# Patient Record
Sex: MALE | Race: WHITE | Employment: OTHER | URBAN - NONMETROPOLITAN AREA
[De-identification: names, ages, dates, MRNs, and addresses within clinical notes are randomized per-mention and may not be internally consistent; named-entity substitution may affect disease eponyms.]

---

## 2017-01-09 DIAGNOSIS — E08.29 DIABETES MELLITUS DUE TO UNDERLYING CONDITION WITH OTHER DIABETIC KIDNEY COMPLICATION, WITH LONG-TERM CURRENT USE OF INSULIN (HCC): Primary | ICD-10-CM

## 2017-01-09 DIAGNOSIS — Z79.4 DIABETES MELLITUS DUE TO UNDERLYING CONDITION WITH OTHER DIABETIC KIDNEY COMPLICATION, WITH LONG-TERM CURRENT USE OF INSULIN (HCC): Primary | ICD-10-CM

## 2017-01-09 DIAGNOSIS — E11.42 TYPE 2 DIABETES MELLITUS WITH DIABETIC POLYNEUROPATHY, UNSPECIFIED LONG TERM INSULIN USE STATUS: ICD-10-CM

## 2017-01-09 RX ORDER — GLIMEPIRIDE 4 MG/1
4 TABLET ORAL
Qty: 60 TABLET | Refills: 3 | OUTPATIENT
Start: 2017-01-09 | End: 2017-03-30 | Stop reason: SDUPTHER

## 2017-01-09 RX ORDER — GLIMEPIRIDE 1 MG/1
1 TABLET ORAL EVERY EVENING
Qty: 60 TABLET | Refills: 3 | Status: SHIPPED | OUTPATIENT
Start: 2017-01-09 | End: 2017-02-06 | Stop reason: SDUPTHER

## 2017-02-06 ENCOUNTER — OFFICE VISIT (OUTPATIENT)
Dept: INTERNAL MEDICINE CLINIC | Age: 67
End: 2017-02-06

## 2017-02-06 VITALS
BODY MASS INDEX: 38.81 KG/M2 | SYSTOLIC BLOOD PRESSURE: 120 MMHG | WEIGHT: 277.2 LBS | HEIGHT: 71 IN | HEART RATE: 61 BPM | OXYGEN SATURATION: 98 % | RESPIRATION RATE: 12 BRPM | DIASTOLIC BLOOD PRESSURE: 70 MMHG

## 2017-02-06 DIAGNOSIS — Z79.4 DIABETES MELLITUS DUE TO UNDERLYING CONDITION WITH OTHER DIABETIC KIDNEY COMPLICATION, WITH LONG-TERM CURRENT USE OF INSULIN (HCC): Primary | ICD-10-CM

## 2017-02-06 DIAGNOSIS — E08.29 DIABETES MELLITUS DUE TO UNDERLYING CONDITION WITH OTHER DIABETIC KIDNEY COMPLICATION, WITH LONG-TERM CURRENT USE OF INSULIN (HCC): Primary | ICD-10-CM

## 2017-02-06 DIAGNOSIS — I10 ESSENTIAL HYPERTENSION: ICD-10-CM

## 2017-02-06 DIAGNOSIS — E78.2 MIXED HYPERLIPIDEMIA: ICD-10-CM

## 2017-02-06 DIAGNOSIS — N18.3 CKD (CHRONIC KIDNEY DISEASE), STAGE 3 (MODERATE): ICD-10-CM

## 2017-02-06 LAB
BILIRUBIN URINE: NEGATIVE
BLOOD, URINE: NEGATIVE
CLARITY: CLEAR
COLOR: YELLOW
EPITHELIAL CELLS, UA: 1 /HPF (ref 0–5)
GLUCOSE URINE: 100 MG/DL
HYALINE CASTS: 0 /HPF (ref 0–8)
KETONES, URINE: NEGATIVE MG/DL
LEUKOCYTE ESTERASE, URINE: NEGATIVE
MICROSCOPIC EXAMINATION: ABNORMAL
NITRITE, URINE: NEGATIVE
PH UA: 6.5
PROTEIN UA: NEGATIVE MG/DL
RBC UA: 0 /HPF (ref 0–4)
SPECIFIC GRAVITY UA: 1.02
UROBILINOGEN, URINE: 0.2 E.U./DL
WBC UA: 0 /HPF (ref 0–5)

## 2017-02-06 PROCEDURE — 99214 OFFICE O/P EST MOD 30 MIN: CPT | Performed by: INTERNAL MEDICINE

## 2017-02-06 PROCEDURE — 1123F ACP DISCUSS/DSCN MKR DOCD: CPT | Performed by: INTERNAL MEDICINE

## 2017-02-06 PROCEDURE — 81001 URINALYSIS AUTO W/SCOPE: CPT | Performed by: INTERNAL MEDICINE

## 2017-02-06 PROCEDURE — G8484 FLU IMMUNIZE NO ADMIN: HCPCS | Performed by: INTERNAL MEDICINE

## 2017-02-06 PROCEDURE — 3017F COLORECTAL CA SCREEN DOC REV: CPT | Performed by: INTERNAL MEDICINE

## 2017-02-06 PROCEDURE — 4040F PNEUMOC VAC/ADMIN/RCVD: CPT | Performed by: INTERNAL MEDICINE

## 2017-02-06 PROCEDURE — G8419 CALC BMI OUT NRM PARAM NOF/U: HCPCS | Performed by: INTERNAL MEDICINE

## 2017-02-06 PROCEDURE — G8427 DOCREV CUR MEDS BY ELIG CLIN: HCPCS | Performed by: INTERNAL MEDICINE

## 2017-02-06 PROCEDURE — 1036F TOBACCO NON-USER: CPT | Performed by: INTERNAL MEDICINE

## 2017-02-06 RX ORDER — OMEGA-3-ACID ETHYL ESTERS 1 G/1
2 CAPSULE, LIQUID FILLED ORAL 2 TIMES DAILY
Qty: 60 CAPSULE | Refills: 3 | Status: SHIPPED | OUTPATIENT
Start: 2017-02-06 | End: 2017-02-06 | Stop reason: SDUPTHER

## 2017-02-06 RX ORDER — GLIMEPIRIDE 1 MG/1
2 TABLET ORAL EVERY EVENING
Qty: 60 TABLET | Refills: 3 | Status: SHIPPED | OUTPATIENT
Start: 2017-02-06 | End: 2017-03-30 | Stop reason: SDUPTHER

## 2017-02-06 RX ORDER — OMEGA-3-ACID ETHYL ESTERS 1 G/1
2 CAPSULE, LIQUID FILLED ORAL 2 TIMES DAILY
Qty: 120 CAPSULE | Refills: 3 | Status: SHIPPED | OUTPATIENT
Start: 2017-02-06 | End: 2019-07-11 | Stop reason: ALTCHOICE

## 2017-03-28 ENCOUNTER — TELEPHONE (OUTPATIENT)
Dept: INTERNAL MEDICINE CLINIC | Age: 67
End: 2017-03-28

## 2017-03-28 DIAGNOSIS — Z79.4 DIABETES MELLITUS DUE TO UNDERLYING CONDITION WITH OTHER DIABETIC KIDNEY COMPLICATION, WITH LONG-TERM CURRENT USE OF INSULIN (HCC): ICD-10-CM

## 2017-03-28 DIAGNOSIS — N39.490 OVERFLOW INCONTINENCE: ICD-10-CM

## 2017-03-28 DIAGNOSIS — E08.29 DIABETES MELLITUS DUE TO UNDERLYING CONDITION WITH OTHER DIABETIC KIDNEY COMPLICATION, WITH LONG-TERM CURRENT USE OF INSULIN (HCC): ICD-10-CM

## 2017-03-28 DIAGNOSIS — E11.42 TYPE 2 DIABETES MELLITUS WITH DIABETIC POLYNEUROPATHY, UNSPECIFIED LONG TERM INSULIN USE STATUS: ICD-10-CM

## 2017-03-29 DIAGNOSIS — Z79.4 DIABETES MELLITUS DUE TO UNDERLYING CONDITION WITH OTHER DIABETIC KIDNEY COMPLICATION, WITH LONG-TERM CURRENT USE OF INSULIN (HCC): ICD-10-CM

## 2017-03-29 DIAGNOSIS — N39.490 OVERFLOW INCONTINENCE: ICD-10-CM

## 2017-03-29 DIAGNOSIS — E11.22 TYPE 2 DIABETES MELLITUS WITH STAGE 3 CHRONIC KIDNEY DISEASE, WITHOUT LONG-TERM CURRENT USE OF INSULIN (HCC): ICD-10-CM

## 2017-03-29 DIAGNOSIS — E08.29 DIABETES MELLITUS DUE TO UNDERLYING CONDITION WITH OTHER DIABETIC KIDNEY COMPLICATION, WITH LONG-TERM CURRENT USE OF INSULIN (HCC): ICD-10-CM

## 2017-03-29 DIAGNOSIS — E11.42 TYPE 2 DIABETES MELLITUS WITH DIABETIC POLYNEUROPATHY, UNSPECIFIED LONG TERM INSULIN USE STATUS: ICD-10-CM

## 2017-03-29 DIAGNOSIS — N18.30 TYPE 2 DIABETES MELLITUS WITH STAGE 3 CHRONIC KIDNEY DISEASE, WITHOUT LONG-TERM CURRENT USE OF INSULIN (HCC): ICD-10-CM

## 2017-03-29 RX ORDER — OXYBUTYNIN CHLORIDE 5 MG/1
5 TABLET ORAL 3 TIMES DAILY
Qty: 270 TABLET | Refills: 3 | Status: CANCELLED | OUTPATIENT
Start: 2017-03-29

## 2017-03-29 RX ORDER — GLIMEPIRIDE 4 MG/1
4 TABLET ORAL
Qty: 60 TABLET | Refills: 3 | Status: CANCELLED | OUTPATIENT
Start: 2017-03-29

## 2017-03-29 RX ORDER — GLIMEPIRIDE 1 MG/1
2 TABLET ORAL EVERY EVENING
Qty: 60 TABLET | Refills: 3 | Status: CANCELLED | OUTPATIENT
Start: 2017-03-29

## 2017-03-29 RX ORDER — BENAZEPRIL HYDROCHLORIDE 40 MG/1
40 TABLET, FILM COATED ORAL DAILY
Qty: 30 TABLET | Refills: 3 | Status: CANCELLED | OUTPATIENT
Start: 2017-03-29

## 2017-03-30 RX ORDER — BENAZEPRIL HYDROCHLORIDE 40 MG/1
40 TABLET, FILM COATED ORAL DAILY
Qty: 90 TABLET | Refills: 1 | Status: SHIPPED | OUTPATIENT
Start: 2017-03-30

## 2017-03-30 RX ORDER — OXYBUTYNIN CHLORIDE 5 MG/1
5 TABLET ORAL 3 TIMES DAILY
Qty: 270 TABLET | Refills: 1 | Status: SHIPPED | OUTPATIENT
Start: 2017-03-30

## 2017-03-30 RX ORDER — GLIMEPIRIDE 4 MG/1
4 TABLET ORAL
Qty: 90 TABLET | Refills: 1 | Status: SHIPPED | OUTPATIENT
Start: 2017-03-30 | End: 2019-07-11 | Stop reason: ALTCHOICE

## 2017-03-30 RX ORDER — GLIMEPIRIDE 1 MG/1
2 TABLET ORAL EVERY EVENING
Qty: 180 TABLET | Refills: 1 | Status: SHIPPED | OUTPATIENT
Start: 2017-03-30 | End: 2019-07-11 | Stop reason: ALTCHOICE

## 2019-07-11 ENCOUNTER — APPOINTMENT (OUTPATIENT)
Dept: GENERAL RADIOLOGY | Age: 69
End: 2019-07-11
Payer: MEDICARE

## 2019-07-11 ENCOUNTER — HOSPITAL ENCOUNTER (EMERGENCY)
Age: 69
Discharge: HOME | End: 2019-07-11
Attending: EMERGENCY MEDICINE
Payer: MEDICARE

## 2019-07-11 ENCOUNTER — APPOINTMENT (OUTPATIENT)
Dept: CT IMAGING | Age: 69
End: 2019-07-11
Payer: MEDICARE

## 2019-07-11 VITALS
RESPIRATION RATE: 16 BRPM | DIASTOLIC BLOOD PRESSURE: 67 MMHG | OXYGEN SATURATION: 96 % | HEIGHT: 71 IN | TEMPERATURE: 98.5 F | WEIGHT: 286 LBS | HEART RATE: 77 BPM | SYSTOLIC BLOOD PRESSURE: 150 MMHG | BODY MASS INDEX: 40.04 KG/M2

## 2019-07-11 DIAGNOSIS — R82.71 BACTERIA IN URINE: Primary | ICD-10-CM

## 2019-07-11 DIAGNOSIS — R10.9 FLANK PAIN: ICD-10-CM

## 2019-07-11 DIAGNOSIS — R11.0 NAUSEA: ICD-10-CM

## 2019-07-11 LAB
ALBUMIN SERPL-MCNC: 4.9 GM/DL (ref 3.4–5)
ALP BLD-CCNC: 68 IU/L (ref 40–129)
ALT SERPL-CCNC: 27 U/L (ref 10–40)
ANION GAP SERPL CALCULATED.3IONS-SCNC: 15 MMOL/L (ref 4–16)
AST SERPL-CCNC: 23 IU/L (ref 15–37)
BACTERIA: ABNORMAL /HPF
BASOPHILS ABSOLUTE: 0.1 K/CU MM
BASOPHILS RELATIVE PERCENT: 1 % (ref 0–1)
BILIRUB SERPL-MCNC: 0.5 MG/DL (ref 0–1)
BILIRUBIN URINE: NEGATIVE MG/DL
BLOOD, URINE: NEGATIVE
BUN BLDV-MCNC: 29 MG/DL (ref 6–23)
CALCIUM SERPL-MCNC: 10.6 MG/DL (ref 8.3–10.6)
CAST TYPE: ABNORMAL /HPF
CHLORIDE BLD-SCNC: 94 MMOL/L (ref 99–110)
CLARITY: CLEAR
CO2: 27 MMOL/L (ref 21–32)
COLOR: YELLOW
CREAT SERPL-MCNC: 1.1 MG/DL (ref 0.9–1.3)
CRYSTAL TYPE: ABNORMAL /HPF
DIFFERENTIAL TYPE: ABNORMAL
EOSINOPHILS ABSOLUTE: 0.2 K/CU MM
EOSINOPHILS RELATIVE PERCENT: 2.7 % (ref 0–3)
EPITHELIAL CELLS, UA: ABNORMAL /HPF
GFR AFRICAN AMERICAN: >60 ML/MIN/1.73M2
GFR NON-AFRICAN AMERICAN: >60 ML/MIN/1.73M2
GLUCOSE BLD-MCNC: 238 MG/DL (ref 70–99)
GLUCOSE, URINE: 250 MG/DL
HCT VFR BLD CALC: 49.9 % (ref 42–52)
HEMOGLOBIN: 17 GM/DL (ref 13.5–18)
IMMATURE NEUTROPHIL %: 0.4 % (ref 0–0.43)
KETONES, URINE: NEGATIVE MG/DL
LACTATE: ABNORMAL MMOL/L (ref 0.4–2)
LEUKOCYTE ESTERASE, URINE: NEGATIVE
LIPASE: 12 IU/L (ref 13–60)
LYMPHOCYTES ABSOLUTE: 2.6 K/CU MM
LYMPHOCYTES RELATIVE PERCENT: 31.2 % (ref 24–44)
MCH RBC QN AUTO: 31.1 PG (ref 27–31)
MCHC RBC AUTO-ENTMCNC: 34.1 % (ref 32–36)
MCV RBC AUTO: 91.2 FL (ref 78–100)
MONOCYTES ABSOLUTE: 0.9 K/CU MM
MONOCYTES RELATIVE PERCENT: 11.2 % (ref 0–4)
MUCUS: NEGATIVE HPF
NITRITE URINE, QUANTITATIVE: NEGATIVE
PDW BLD-RTO: 13.1 % (ref 11.7–14.9)
PH, URINE: 5.5 (ref 5–8)
PLATELET # BLD: 233 K/CU MM (ref 140–440)
PMV BLD AUTO: 11.1 FL (ref 7.5–11.1)
POTASSIUM SERPL-SCNC: 4.2 MMOL/L (ref 3.5–5.1)
PROTEIN UA: ABNORMAL MG/DL
RBC # BLD: 5.47 M/CU MM (ref 4.6–6.2)
RBC URINE: ABNORMAL /HPF (ref 0–3)
SEGMENTED NEUTROPHILS ABSOLUTE COUNT: 4.5 K/CU MM
SEGMENTED NEUTROPHILS RELATIVE PERCENT: 53.5 % (ref 36–66)
SODIUM BLD-SCNC: 136 MMOL/L (ref 135–145)
SPECIFIC GRAVITY UA: 1.01 (ref 1–1.03)
TOTAL IMMATURE NEUTOROPHIL: 0.03 K/CU MM
TOTAL PROTEIN: 8.3 GM/DL (ref 6.4–8.2)
UROBILINOGEN, URINE: 1 MG/DL (ref 0.2–1)
VOLUME, (UVOL): 12 ML (ref 10–12)
WBC # BLD: 8.3 K/CU MM (ref 4–10.5)
WBC UA: ABNORMAL /HPF (ref 0–2)

## 2019-07-11 PROCEDURE — 87086 URINE CULTURE/COLONY COUNT: CPT

## 2019-07-11 PROCEDURE — 81001 URINALYSIS AUTO W/SCOPE: CPT

## 2019-07-11 PROCEDURE — 96375 TX/PRO/DX INJ NEW DRUG ADDON: CPT

## 2019-07-11 PROCEDURE — 6360000004 HC RX CONTRAST MEDICATION: Performed by: EMERGENCY MEDICINE

## 2019-07-11 PROCEDURE — 99284 EMERGENCY DEPT VISIT MOD MDM: CPT

## 2019-07-11 PROCEDURE — 74177 CT ABD & PELVIS W/CONTRAST: CPT

## 2019-07-11 PROCEDURE — 2580000003 HC RX 258: Performed by: EMERGENCY MEDICINE

## 2019-07-11 PROCEDURE — 96374 THER/PROPH/DIAG INJ IV PUSH: CPT

## 2019-07-11 PROCEDURE — 85025 COMPLETE CBC W/AUTO DIFF WBC: CPT

## 2019-07-11 PROCEDURE — 83605 ASSAY OF LACTIC ACID: CPT

## 2019-07-11 PROCEDURE — 80053 COMPREHEN METABOLIC PANEL: CPT

## 2019-07-11 PROCEDURE — 87040 BLOOD CULTURE FOR BACTERIA: CPT

## 2019-07-11 PROCEDURE — 6370000000 HC RX 637 (ALT 250 FOR IP): Performed by: EMERGENCY MEDICINE

## 2019-07-11 PROCEDURE — 71045 X-RAY EXAM CHEST 1 VIEW: CPT

## 2019-07-11 PROCEDURE — 6360000002 HC RX W HCPCS: Performed by: EMERGENCY MEDICINE

## 2019-07-11 PROCEDURE — 83690 ASSAY OF LIPASE: CPT

## 2019-07-11 RX ORDER — ONDANSETRON 4 MG/1
4 TABLET, ORALLY DISINTEGRATING ORAL 3 TIMES DAILY PRN
Qty: 21 TABLET | Refills: 0 | Status: SHIPPED | OUTPATIENT
Start: 2019-07-11 | End: 2020-09-28 | Stop reason: ALTCHOICE

## 2019-07-11 RX ORDER — IBUPROFEN 800 MG/1
800 TABLET ORAL 2 TIMES DAILY PRN
Qty: 30 TABLET | Refills: 0 | Status: ON HOLD | OUTPATIENT
Start: 2019-07-11 | End: 2020-09-30 | Stop reason: HOSPADM

## 2019-07-11 RX ORDER — SULFAMETHOXAZOLE AND TRIMETHOPRIM 800; 160 MG/1; MG/1
1 TABLET ORAL 2 TIMES DAILY
Qty: 28 TABLET | Refills: 0 | Status: SHIPPED | OUTPATIENT
Start: 2019-07-11 | End: 2019-07-25

## 2019-07-11 RX ORDER — ALOGLIPTIN 25 MG/1
12.5 TABLET, FILM COATED ORAL DAILY
COMMUNITY

## 2019-07-11 RX ORDER — INSULIN GLARGINE 100 [IU]/ML
33 INJECTION, SOLUTION SUBCUTANEOUS 2 TIMES DAILY
COMMUNITY

## 2019-07-11 RX ORDER — ONDANSETRON 2 MG/ML
4 INJECTION INTRAMUSCULAR; INTRAVENOUS EVERY 30 MIN PRN
Status: DISCONTINUED | OUTPATIENT
Start: 2019-07-11 | End: 2019-07-11 | Stop reason: HOSPADM

## 2019-07-11 RX ORDER — 0.9 % SODIUM CHLORIDE 0.9 %
1000 INTRAVENOUS SOLUTION INTRAVENOUS ONCE
Status: COMPLETED | OUTPATIENT
Start: 2019-07-11 | End: 2019-07-11

## 2019-07-11 RX ORDER — HYDROCODONE BITARTRATE AND ACETAMINOPHEN 5; 325 MG/1; MG/1
1 TABLET ORAL EVERY 4 HOURS PRN
Qty: 15 TABLET | Refills: 0 | Status: SHIPPED | OUTPATIENT
Start: 2019-07-11 | End: 2019-07-14

## 2019-07-11 RX ORDER — MORPHINE SULFATE 4 MG/ML
4 INJECTION, SOLUTION INTRAMUSCULAR; INTRAVENOUS EVERY 30 MIN PRN
Status: DISCONTINUED | OUTPATIENT
Start: 2019-07-11 | End: 2019-07-11 | Stop reason: HOSPADM

## 2019-07-11 RX ORDER — SULFAMETHOXAZOLE AND TRIMETHOPRIM 800; 160 MG/1; MG/1
1 TABLET ORAL ONCE
Status: COMPLETED | OUTPATIENT
Start: 2019-07-11 | End: 2019-07-11

## 2019-07-11 RX ORDER — DULOXETIN HYDROCHLORIDE 30 MG/1
60 CAPSULE, DELAYED RELEASE ORAL DAILY
COMMUNITY
End: 2021-06-24

## 2019-07-11 RX ADMIN — IOPAMIDOL 75 ML: 755 INJECTION, SOLUTION INTRAVENOUS at 16:02

## 2019-07-11 RX ADMIN — SULFAMETHOXAZOLE AND TRIMETHOPRIM 1 TABLET: 800; 160 TABLET ORAL at 17:00

## 2019-07-11 RX ADMIN — SODIUM CHLORIDE 1000 ML: 9 INJECTION, SOLUTION INTRAVENOUS at 15:07

## 2019-07-11 RX ADMIN — ONDANSETRON 4 MG: 2 INJECTION INTRAMUSCULAR; INTRAVENOUS at 15:11

## 2019-07-11 RX ADMIN — MORPHINE SULFATE 4 MG: 4 INJECTION INTRAVENOUS at 15:12

## 2019-07-11 ASSESSMENT — PAIN SCALES - GENERAL
PAINLEVEL_OUTOF10: 4
PAINLEVEL_OUTOF10: 5
PAINLEVEL_OUTOF10: 5

## 2019-07-11 ASSESSMENT — PAIN DESCRIPTION - DESCRIPTORS: DESCRIPTORS: ACHING

## 2019-07-11 ASSESSMENT — PAIN DESCRIPTION - LOCATION
LOCATION: ABDOMEN;FLANK
LOCATION: BACK

## 2019-07-11 ASSESSMENT — PAIN DESCRIPTION - FREQUENCY: FREQUENCY: CONTINUOUS

## 2019-07-11 ASSESSMENT — PAIN DESCRIPTION - PAIN TYPE
TYPE: ACUTE PAIN
TYPE: ACUTE PAIN

## 2019-07-11 ASSESSMENT — PAIN DESCRIPTION - ORIENTATION: ORIENTATION: LOWER

## 2019-07-11 NOTE — ED NOTES
Pt medicated as ordered. IVF hung and are infusing with no difficulty. Pt has no tenderness to abdomen on palpation. Pt informed of wait for kidney function results to come back before CT scan. Pt verbalizes understanding. Pt states this feels like when he has prostatitis in the past. Denies any pain or burning with urination. Call light in reach. Wife at bedside. Denies any need at this time.  Pt unable to obtain urine specimen at this time but states he will left me know when he can     Jose Morales RN  07/11/19 4741

## 2019-07-13 LAB
CULTURE: NORMAL
Lab: NORMAL
SPECIMEN: NORMAL

## 2019-07-16 LAB
CULTURE: NORMAL
CULTURE: NORMAL
Lab: NORMAL
Lab: NORMAL
SPECIMEN: NORMAL
SPECIMEN: NORMAL

## 2019-08-10 LAB
CHOLESTEROL, TOTAL: 166 MG/DL
CHOLESTEROL/HDL RATIO: ABNORMAL
HDLC SERPL-MCNC: 29 MG/DL (ref 35–70)
LDL CHOLESTEROL CALCULATED: 58 MG/DL (ref 0–160)
NONHDLC SERPL-MCNC: ABNORMAL MG/DL
TRIGL SERPL-MCNC: 397 MG/DL
VLDLC SERPL CALC-MCNC: ABNORMAL MG/DL

## 2019-09-09 LAB
BUN BLDV-MCNC: NORMAL MG/DL
CALCIUM SERPL-MCNC: NORMAL MG/DL
CHLORIDE BLD-SCNC: NORMAL MMOL/L
CO2: 26 MMOL/L
CREAT SERPL-MCNC: 1.4 MG/DL
GFR CALCULATED: NORMAL
GLUCOSE BLD-MCNC: 170 MG/DL
POTASSIUM SERPL-SCNC: 4.3 MMOL/L
SODIUM BLD-SCNC: 135 MMOL/L

## 2020-09-28 ENCOUNTER — HOSPITAL ENCOUNTER (INPATIENT)
Age: 70
LOS: 2 days | Discharge: HOME | DRG: 247 | End: 2020-09-30
Attending: INTERNAL MEDICINE | Admitting: INTERNAL MEDICINE
Payer: OTHER GOVERNMENT

## 2020-09-28 ENCOUNTER — APPOINTMENT (OUTPATIENT)
Dept: GENERAL RADIOLOGY | Age: 70
End: 2020-09-28
Payer: OTHER GOVERNMENT

## 2020-09-28 ENCOUNTER — HOSPITAL ENCOUNTER (EMERGENCY)
Age: 70
Discharge: ANOTHER HOSPITAL | End: 2020-09-28
Attending: EMERGENCY MEDICINE
Payer: OTHER GOVERNMENT

## 2020-09-28 VITALS
DIASTOLIC BLOOD PRESSURE: 51 MMHG | BODY MASS INDEX: 37.38 KG/M2 | OXYGEN SATURATION: 100 % | RESPIRATION RATE: 12 BRPM | WEIGHT: 267 LBS | SYSTOLIC BLOOD PRESSURE: 119 MMHG | HEART RATE: 52 BPM | TEMPERATURE: 98.4 F | HEIGHT: 71 IN

## 2020-09-28 PROBLEM — I21.4 NSTEMI (NON-ST ELEVATED MYOCARDIAL INFARCTION) (HCC): Status: ACTIVE | Noted: 2020-09-28

## 2020-09-28 LAB
ANION GAP SERPL CALCULATED.3IONS-SCNC: 10 MMOL/L (ref 4–16)
APTT: 30.2 SECONDS (ref 25.1–37.1)
BASOPHILS ABSOLUTE: 0.1 K/CU MM
BASOPHILS RELATIVE PERCENT: 0.8 % (ref 0–1)
BUN BLDV-MCNC: 32 MG/DL (ref 6–23)
CALCIUM SERPL-MCNC: 9.9 MG/DL (ref 8.3–10.6)
CHLORIDE BLD-SCNC: 96 MMOL/L (ref 99–110)
CO2: 29 MMOL/L (ref 21–32)
CREAT SERPL-MCNC: 1.1 MG/DL (ref 0.9–1.3)
DIFFERENTIAL TYPE: ABNORMAL
EOSINOPHILS ABSOLUTE: 0.2 K/CU MM
EOSINOPHILS RELATIVE PERCENT: 2.5 % (ref 0–3)
GFR AFRICAN AMERICAN: >60 ML/MIN/1.73M2
GFR NON-AFRICAN AMERICAN: >60 ML/MIN/1.73M2
GLUCOSE BLD-MCNC: 229 MG/DL (ref 70–99)
HCT VFR BLD CALC: 46.6 % (ref 42–52)
HEMOGLOBIN: 16.2 GM/DL (ref 13.5–18)
IMMATURE NEUTROPHIL %: 0.3 % (ref 0–0.43)
LYMPHOCYTES ABSOLUTE: 1.9 K/CU MM
LYMPHOCYTES RELATIVE PERCENT: 32.5 % (ref 24–44)
MCH RBC QN AUTO: 32.1 PG (ref 27–31)
MCHC RBC AUTO-ENTMCNC: 34.8 % (ref 32–36)
MCV RBC AUTO: 92.3 FL (ref 78–100)
MONOCYTES ABSOLUTE: 0.7 K/CU MM
MONOCYTES RELATIVE PERCENT: 11.1 % (ref 0–4)
PDW BLD-RTO: 13 % (ref 11.7–14.9)
PLATELET # BLD: 194 K/CU MM (ref 140–440)
PMV BLD AUTO: 10.9 FL (ref 7.5–11.1)
POTASSIUM SERPL-SCNC: 4.1 MMOL/L (ref 3.5–5.1)
RBC # BLD: 5.05 M/CU MM (ref 4.6–6.2)
SEGMENTED NEUTROPHILS ABSOLUTE COUNT: 3.1 K/CU MM
SEGMENTED NEUTROPHILS RELATIVE PERCENT: 52.8 % (ref 36–66)
SODIUM BLD-SCNC: 135 MMOL/L (ref 135–145)
TOTAL IMMATURE NEUTOROPHIL: 0.02 K/CU MM
TROPONIN T: 0.02 NG/ML
WBC # BLD: 5.9 K/CU MM (ref 4–10.5)

## 2020-09-28 PROCEDURE — 96365 THER/PROPH/DIAG IV INF INIT: CPT

## 2020-09-28 PROCEDURE — 85025 COMPLETE CBC W/AUTO DIFF WBC: CPT

## 2020-09-28 PROCEDURE — 2140000000 HC CCU INTERMEDIATE R&B

## 2020-09-28 PROCEDURE — 80048 BASIC METABOLIC PNL TOTAL CA: CPT

## 2020-09-28 PROCEDURE — 84484 ASSAY OF TROPONIN QUANT: CPT

## 2020-09-28 PROCEDURE — 93005 ELECTROCARDIOGRAM TRACING: CPT | Performed by: EMERGENCY MEDICINE

## 2020-09-28 PROCEDURE — 85730 THROMBOPLASTIN TIME PARTIAL: CPT

## 2020-09-28 PROCEDURE — 93010 ELECTROCARDIOGRAM REPORT: CPT | Performed by: INTERNAL MEDICINE

## 2020-09-28 PROCEDURE — 6360000002 HC RX W HCPCS

## 2020-09-28 PROCEDURE — 71045 X-RAY EXAM CHEST 1 VIEW: CPT

## 2020-09-28 PROCEDURE — 99285 EMERGENCY DEPT VISIT HI MDM: CPT

## 2020-09-28 PROCEDURE — 6370000000 HC RX 637 (ALT 250 FOR IP): Performed by: EMERGENCY MEDICINE

## 2020-09-28 PROCEDURE — 96366 THER/PROPH/DIAG IV INF ADDON: CPT

## 2020-09-28 PROCEDURE — 6360000002 HC RX W HCPCS: Performed by: EMERGENCY MEDICINE

## 2020-09-28 RX ORDER — CLONIDINE HYDROCHLORIDE 0.3 MG/1
0.3 TABLET ORAL 2 TIMES DAILY
Status: ON HOLD | COMMUNITY
End: 2020-09-30 | Stop reason: HOSPADM

## 2020-09-28 RX ORDER — ASPIRIN 81 MG/1
81 TABLET, CHEWABLE ORAL DAILY
Status: ON HOLD | COMMUNITY
End: 2020-09-30 | Stop reason: SDUPTHER

## 2020-09-28 RX ORDER — POTASSIUM CHLORIDE 20 MEQ/1
40 TABLET, EXTENDED RELEASE ORAL PRN
Status: DISCONTINUED | OUTPATIENT
Start: 2020-09-28 | End: 2020-09-30 | Stop reason: HOSPADM

## 2020-09-28 RX ORDER — HEPARIN SODIUM 1000 [USP'U]/ML
4000 INJECTION, SOLUTION INTRAVENOUS; SUBCUTANEOUS ONCE
Status: DISCONTINUED | OUTPATIENT
Start: 2020-09-28 | End: 2020-09-28 | Stop reason: HOSPADM

## 2020-09-28 RX ORDER — ACETAMINOPHEN 325 MG/1
650 TABLET ORAL EVERY 6 HOURS PRN
Status: DISCONTINUED | OUTPATIENT
Start: 2020-09-28 | End: 2020-09-29

## 2020-09-28 RX ORDER — ASPIRIN 81 MG/1
81 TABLET, CHEWABLE ORAL DAILY
Status: DISCONTINUED | OUTPATIENT
Start: 2020-09-29 | End: 2020-09-30 | Stop reason: HOSPADM

## 2020-09-28 RX ORDER — ASPIRIN 81 MG/1
324 TABLET, CHEWABLE ORAL ONCE
Status: COMPLETED | OUTPATIENT
Start: 2020-09-28 | End: 2020-09-28

## 2020-09-28 RX ORDER — ONDANSETRON 2 MG/ML
4 INJECTION INTRAMUSCULAR; INTRAVENOUS EVERY 6 HOURS PRN
Status: DISCONTINUED | OUTPATIENT
Start: 2020-09-28 | End: 2020-09-30 | Stop reason: HOSPADM

## 2020-09-28 RX ORDER — SODIUM CHLORIDE 0.9 % (FLUSH) 0.9 %
10 SYRINGE (ML) INJECTION EVERY 12 HOURS SCHEDULED
Status: DISCONTINUED | OUTPATIENT
Start: 2020-09-29 | End: 2020-09-29

## 2020-09-28 RX ORDER — HEPARIN SODIUM 1000 [USP'U]/ML
4000 INJECTION, SOLUTION INTRAVENOUS; SUBCUTANEOUS PRN
Status: DISCONTINUED | OUTPATIENT
Start: 2020-09-28 | End: 2020-09-29

## 2020-09-28 RX ORDER — SODIUM CHLORIDE 0.9 % (FLUSH) 0.9 %
10 SYRINGE (ML) INJECTION PRN
Status: DISCONTINUED | OUTPATIENT
Start: 2020-09-28 | End: 2020-09-29

## 2020-09-28 RX ORDER — NICOTINE POLACRILEX 4 MG
15 LOZENGE BUCCAL PRN
Status: DISCONTINUED | OUTPATIENT
Start: 2020-09-28 | End: 2020-09-30 | Stop reason: HOSPADM

## 2020-09-28 RX ORDER — MAGNESIUM SULFATE IN WATER 40 MG/ML
2 INJECTION, SOLUTION INTRAVENOUS PRN
Status: DISCONTINUED | OUTPATIENT
Start: 2020-09-28 | End: 2020-09-30 | Stop reason: HOSPADM

## 2020-09-28 RX ORDER — ATORVASTATIN CALCIUM 40 MG/1
40 TABLET, FILM COATED ORAL DAILY
Status: DISCONTINUED | OUTPATIENT
Start: 2020-09-29 | End: 2020-09-30 | Stop reason: HOSPADM

## 2020-09-28 RX ORDER — HEPARIN SODIUM 10000 [USP'U]/100ML
8.3 INJECTION, SOLUTION INTRAVENOUS CONTINUOUS
Status: DISCONTINUED | OUTPATIENT
Start: 2020-09-29 | End: 2020-09-29

## 2020-09-28 RX ORDER — DEXTROSE MONOHYDRATE 25 G/50ML
12.5 INJECTION, SOLUTION INTRAVENOUS PRN
Status: DISCONTINUED | OUTPATIENT
Start: 2020-09-28 | End: 2020-09-30 | Stop reason: HOSPADM

## 2020-09-28 RX ORDER — NITROGLYCERIN 0.4 MG/1
0.4 TABLET SUBLINGUAL EVERY 5 MIN PRN
Status: DISCONTINUED | OUTPATIENT
Start: 2020-09-28 | End: 2020-09-30 | Stop reason: HOSPADM

## 2020-09-28 RX ORDER — HEPARIN SODIUM 1000 [USP'U]/ML
2000 INJECTION, SOLUTION INTRAVENOUS; SUBCUTANEOUS PRN
Status: DISCONTINUED | OUTPATIENT
Start: 2020-09-28 | End: 2020-09-29

## 2020-09-28 RX ORDER — PROMETHAZINE HYDROCHLORIDE 25 MG/1
12.5 TABLET ORAL EVERY 6 HOURS PRN
Status: DISCONTINUED | OUTPATIENT
Start: 2020-09-28 | End: 2020-09-30 | Stop reason: HOSPADM

## 2020-09-28 RX ORDER — HEPARIN SODIUM 10000 [USP'U]/100ML
1000 INJECTION, SOLUTION INTRAVENOUS CONTINUOUS
Status: DISCONTINUED | OUTPATIENT
Start: 2020-09-28 | End: 2020-09-28 | Stop reason: HOSPADM

## 2020-09-28 RX ORDER — HEPARIN SODIUM 1000 [USP'U]/ML
2000 INJECTION, SOLUTION INTRAVENOUS; SUBCUTANEOUS PRN
Status: DISCONTINUED | OUTPATIENT
Start: 2020-09-28 | End: 2020-09-28 | Stop reason: HOSPADM

## 2020-09-28 RX ORDER — HEPARIN SODIUM 1000 [USP'U]/ML
60 INJECTION, SOLUTION INTRAVENOUS; SUBCUTANEOUS ONCE
Status: DISCONTINUED | OUTPATIENT
Start: 2020-09-29 | End: 2020-09-29

## 2020-09-28 RX ORDER — INSULIN GLARGINE 100 [IU]/ML
20 INJECTION, SOLUTION SUBCUTANEOUS NIGHTLY
Status: DISCONTINUED | OUTPATIENT
Start: 2020-09-29 | End: 2020-09-30 | Stop reason: HOSPADM

## 2020-09-28 RX ORDER — OXYBUTYNIN CHLORIDE 5 MG/1
5 TABLET ORAL 3 TIMES DAILY
Status: DISCONTINUED | OUTPATIENT
Start: 2020-09-29 | End: 2020-09-30 | Stop reason: HOSPADM

## 2020-09-28 RX ORDER — ACETAMINOPHEN 650 MG/1
650 SUPPOSITORY RECTAL EVERY 6 HOURS PRN
Status: DISCONTINUED | OUTPATIENT
Start: 2020-09-28 | End: 2020-09-29

## 2020-09-28 RX ORDER — POTASSIUM CHLORIDE 7.45 MG/ML
10 INJECTION INTRAVENOUS PRN
Status: DISCONTINUED | OUTPATIENT
Start: 2020-09-28 | End: 2020-09-30 | Stop reason: HOSPADM

## 2020-09-28 RX ORDER — HEPARIN SODIUM 1000 [USP'U]/ML
4000 INJECTION, SOLUTION INTRAVENOUS; SUBCUTANEOUS PRN
Status: DISCONTINUED | OUTPATIENT
Start: 2020-09-28 | End: 2020-09-28 | Stop reason: HOSPADM

## 2020-09-28 RX ORDER — HEPARIN SODIUM 5000 [USP'U]/ML
INJECTION, SOLUTION INTRAVENOUS; SUBCUTANEOUS
Status: COMPLETED
Start: 2020-09-28 | End: 2020-09-28

## 2020-09-28 RX ORDER — DULOXETIN HYDROCHLORIDE 30 MG/1
60 CAPSULE, DELAYED RELEASE ORAL DAILY
Status: DISCONTINUED | OUTPATIENT
Start: 2020-09-29 | End: 2020-09-30 | Stop reason: HOSPADM

## 2020-09-28 RX ORDER — DEXTROSE MONOHYDRATE 50 MG/ML
100 INJECTION, SOLUTION INTRAVENOUS PRN
Status: DISCONTINUED | OUTPATIENT
Start: 2020-09-28 | End: 2020-09-30 | Stop reason: HOSPADM

## 2020-09-28 RX ORDER — GLIPIZIDE 5 MG/1
5 TABLET ORAL
Status: ON HOLD | COMMUNITY
End: 2020-09-30 | Stop reason: HOSPADM

## 2020-09-28 RX ORDER — POLYETHYLENE GLYCOL 3350 17 G/17G
17 POWDER, FOR SOLUTION ORAL DAILY PRN
Status: DISCONTINUED | OUTPATIENT
Start: 2020-09-28 | End: 2020-09-30 | Stop reason: HOSPADM

## 2020-09-28 RX ADMIN — HEPARIN SODIUM AND DEXTROSE 1000 UNITS/HR: 10000; 5 INJECTION INTRAVENOUS at 14:51

## 2020-09-28 RX ADMIN — HEPARIN SODIUM 4000 UNITS: 5000 INJECTION INTRAVENOUS; SUBCUTANEOUS at 14:49

## 2020-09-28 RX ADMIN — ASPIRIN 81 MG 243 MG: 81 TABLET ORAL at 13:34

## 2020-09-28 ASSESSMENT — ENCOUNTER SYMPTOMS
EYE ITCHING: 0
BACK PAIN: 0
WHEEZING: 0
CHEST TIGHTNESS: 0
VOMITING: 0
DIARRHEA: 0
RHINORRHEA: 0
SORE THROAT: 0
EYE DISCHARGE: 0
SHORTNESS OF BREATH: 1
COUGH: 0
BLOOD IN STOOL: 0
RESPIRATORY NEGATIVE: 1
ABDOMINAL PAIN: 0

## 2020-09-28 NOTE — ED PROVIDER NOTES
Triage Chief Complaint:   Chest Pain (right side x 2 days pt recent stress test last week)    [place]:  Chanell Moore is a 69 y.o. male that presents ED with a complaint of a \"vibration\" in his right upper chest.  Is been constant since Saturday patient has diabetes on insulin, hyperlipidemia, hypertension, who had a recent nuclear stress test on September 17 at the [place] that the wife showed me the Xarelto result reveals a small to moderate size reversible defect at his apex going to the inferior aspect his EF is 65%. Patient denies any shortness of breath no past medical history of CAD he is not scheduled follow-up the [place] until October 25.     Past Medical History:   Diagnosis Date    Diabetes mellitus ([facility]     Diabetes mellitus due to underlying condition with renal complication, with long-term current use of insulin ([facility] [address] [facility] 9/27/2016    Erectile dysfunction     Hearing impairment     pt wears hearing aids    Hyperlipidemia     Hypertension     Incontinent of urine     Neuropathy 1/22/2016    Obesity     Uses hearing aid     right ear     Past Surgical History:   Procedure Laterality Date    BACK SURGERY      COLONOSCOPY      LAMINECTOMY  5/20/12    L3-4 L4-5 decom ryan    PILONIDAL CYST EXCISION      PILONIDAL CYST EXCISION  1975    TURP      TURP  2005     Family History   Problem Relation Age of Onset    Heart Disease Mother     Diabetes Mother     Cancer Father         renal    Heart Disease Brother         had heart transplant     Cancer Other     Other Mother 47        aortic aneurysm     High Blood Pressure Mother     Heart Failure Brother 52    Cancer Brother     Heart Attack Sister     COPD Sister     Cancer Brother         colon    Other Paternal Grandmother         hemorrhaged to death    Diabetes Maternal Aunt     Diabetes Maternal Aunt     Heart Disease Maternal Aunt     Diabetes Other      Social History     Socioeconomic History    Marital status:      Spouse name: Not on file    Number of children: Not on file    Years of education: Not on file    Highest education level: Not on file   Occupational History    Not on file   Social Needs    Financial resource strain: Not on file    Food insecurity     Worry: Not on file     Inability: Not on file    Transportation needs     Medical: Not on file     Non-medical: Not on file   Tobacco Use    Smoking status: Former Smoker     Packs/day: 2.50     Years: 20.00     Pack years: 50.00     Last attempt to quit: 5/10/1989     Years since quittin.4    Smokeless tobacco: Never Used   Substance and Sexual Activity    Alcohol use: Yes     Comment: rare    Drug use: No    Sexual activity: Yes   Lifestyle    Physical activity     Days per week: Not on file     Minutes per session: Not on file    Stress: Not on file   Relationships    Social connections     Talks on phone: Not on file     Gets together: Not on file     Attends Latter-day service: Not on file     Active member of club or organization: Not on file     Attends meetings of clubs or organizations: Not on file     Relationship status: Not on file    Intimate partner violence     Fear of current or ex partner: Not on file     Emotionally abused: Not on file     Physically abused: Not on file     Forced sexual activity: Not on file   Other Topics Concern    Not on file   Social History Narrative    ** Merged History Encounter **          Current Facility-Administered Medications   Medication Dose Route Frequency Provider Last Rate Last Dose    heparin (porcine) injection 4,000 Units  4,000 Units Intravenous Once Milus Bustle, DO        heparin (porcine) injection 4,000 Units  4,000 Units Intravenous PRN Milus Bustle, DO        heparin (porcine) injection 2,000 Units  2,000 Units Intravenous PRN Milus Bustle, DO        heparin 25,000 units in dextrose 5% 250 mL infusion  1,000 Units/hr Intravenous Continuous Milus Bustle, DO 10 mL/hr at 20 1445 1,000 Units/hr at 09/28/20 1451     Current Outpatient Medications   Medication Sig Dispense Refill    cloNIDine (CATAPRES) 0.3 MG tablet Take 0.3 mg by mouth 2 times daily      glipiZIDE (GLUCOTROL) 5 MG tablet Take 5 mg by mouth 2 times daily (before meals)      insulin glargine (LANTUS) 100 UNIT/ML injection vial Inject 20 Units into the skin nightly      DULoxetine (CYMBALTA) 30 MG extended release capsule Take 30 mg by mouth daily      alogliptin (NESINA) 25 MG TABS tablet Take 25 mg by mouth daily      ibuprofen (ADVIL;MOTRIN) 800 MG tablet Take 1 tablet by mouth 2 times daily as needed for Pain 30 tablet 0    metFORMIN (GLUCOPHAGE) 500 MG tablet Take 1 tablet by mouth 2 times daily (with meals) (Patient taking differently: Take 1,000 mg by mouth 2 times daily (with meals) ) 180 tablet 1    benazepril (LOTENSIN) 40 MG tablet Take 1 tablet by mouth daily 90 tablet 1    oxybutynin (DITROPAN) 5 MG tablet Take 1 tablet by mouth 3 times daily 270 tablet 1    atorvastatin (LIPITOR) 20 MG tablet Take 1 tablet by mouth daily (Patient taking differently: Take 40 mg by mouth daily ) 90 tablet 1    chlorthalidone (HYGROTON) 25 MG tablet Take 25 mg by mouth daily      spironolactone (ALDACTONE) 25 MG tablet Take 1 tablet by mouth daily 90 tablet 3    Blood Glucose Monitoring Suppl (ACURA BLOOD GLUCOSE METER) W/DEVICE KIT 1 kit by Does not apply route once for 1 dose 1 kit 0     Allergies   Allergen Reactions    Celebrex [Celecoxib]      From old chart     Cialis [Tadalafil]      From old chart     Duloxetine Hcl Other (See Comments)     Caused depression    Gabapentin Other (See Comments)     Caused depression    Lyrica [Pregabalin]      Confusion      Pravastatin      From old chart     Zetia [Ezetimibe]      From old chart          ROS:    Review of Systems   Respiratory: Negative. Cardiovascular: Positive for chest pain. All other systems reviewed and are negative.       Nursing Notes oriented to person, place, and time. Cranial Nerves: No cranial nerve deficit. Sensory: No sensory deficit. Deep Tendon Reflexes: Reflexes are normal and symmetric. Reflexes normal.   Psychiatric:         Speech: Speech normal.         Behavior: Behavior normal.         Thought Content:  Thought content normal.         Judgment: Judgment normal.         I have reviewed and interpreted all of the currently available lab results from this visit (ifapplicable):  Results for orders placed or performed during the hospital encounter of 09/28/20   CBC Auto Differential   Result Value Ref Range    WBC 5.9 4.0 - 10.5 K/CU MM    RBC 5.05 4.6 - 6.2 M/CU MM    Hemoglobin 16.2 13.5 - 18.0 GM/DL    Hematocrit 46.6 42 - 52 %    MCV 92.3 78 - 100 FL    MCH 32.1 (H) 27 - 31 PG    MCHC 34.8 32.0 - 36.0 %    RDW 13.0 11.7 - 14.9 %    Platelets 529 024 - 511 K/CU MM    MPV 10.9 7.5 - 11.1 FL    Differential Type AUTOMATED DIFFERENTIAL     Segs Relative 52.8 36 - 66 %    Lymphocytes % 32.5 24 - 44 %    Monocytes % 11.1 (H) 0 - 4 %    Eosinophils % 2.5 0 - 3 %    Basophils % 0.8 0 - 1 %    Segs Absolute 3.1 K/CU MM    Lymphocytes Absolute 1.9 K/CU MM    Monocytes Absolute 0.7 K/CU MM    Eosinophils Absolute 0.2 K/CU MM    Basophils Absolute 0.1 K/CU MM    Immature Neutrophil % 0.3 0 - 0.43 %    Total Immature Neutrophil 0.02 K/CU MM   Basic Metabolic Panel w/ Reflex to MG   Result Value Ref Range    Sodium 135 135 - 145 MMOL/L    Potassium 4.1 3.5 - 5.1 MMOL/L    Chloride 96 (L) 99 - 110 mMol/L    CO2 29 21 - 32 MMOL/L    Anion Gap 10 4 - 16    BUN 32 (H) 6 - 23 MG/DL    CREATININE 1.1 0.9 - 1.3 MG/DL    Glucose 229 (H) 70 - 99 MG/DL    Calcium 9.9 8.3 - 10.6 MG/DL    GFR Non-African American >60 >60 mL/min/1.73m2    GFR African American >60 >60 mL/min/1.73m2   Troponin   Result Value Ref Range    Troponin T 0.024 (H) <0.01 NG/ML   APTT   Result Value Ref Range    aPTT 30.2 25.1 - 37.1 SECONDS   EKG 12 Lead   Result Value Ref Range    Ventricular Rate 86 BPM    Atrial Rate 86 BPM    P-R Interval 190 ms    QRS Duration 100 ms    Q-T Interval 340 ms    QTc Calculation (Bazett) 406 ms    P Axis 64 degrees    R Axis -51 degrees    T Axis 23 degrees    Diagnosis       Normal sinus rhythm  Left anterior fascicular block  Cannot rule out Inferior infarct (masked by fascicular block?) , age undetermined  Anterior infarct , age undetermined  Abnormal ECG  No previous ECGs available        Radiographs (if obtained):  [] The following radiograph wasinterpreted by myself in the absence of a radiologist:   [] Radiologist's Report Reviewed:  XR CHEST PORTABLE   Final Result   No evidence of acute process. EKG (if obtained): (All EKG's are interpreted by myself in the absence of a cardiologist)        The 12 lead EKG was interpreted by me, and the interpretation is as follows:  normal sinus rhythm, rate = 86. Intervals are not within the normal range. LAFB  QTc is not prolonged. ST elevations are not present. T wave inversions are not present. Non-specific T wave changes are not present. Delta waves, Brugada Syndrome, and Short HI are not present. There is no acute ischemia. Q waves:  V1, V2, 2, 3 aVF. Chart review shows recent radiographs:  No results found. MDM:      Presents to the ED with \"vibration\" right upper chest for about to turn off days. Patient has numerous cardiac risk factors he has an elevated troponin in the face of a nondiagnostic EKG. He is having an end STEMI. Patient will be transferred per his request to [address] [place] [address]. [facility] but informed that would be extremely challenging to get him to that facility rapidly. He will be heparinized he received full dose aspirin I will discuss the case for the appropriate use for dual antiplatelet therapy.   Patient is aware the plan        The total Critical Care time is 35 minutes which excludes separately billable

## 2020-09-28 NOTE — ED NOTES
States he had a stress test on 9/17 that is positive. He has a cardiology appt scheduled with [facility] doctor 3rd week in October. Past couple of days he has felt a \"rattling or a vibration\" in the Rt upper chest.  Denied any pain or discomfort. No chest congestion. No diaphoresis or nausea. No SOB.        Janelle Motley RN  09/28/20 0059

## 2020-09-29 LAB
ACTIVATED CLOTTING TIME, LOW RANGE: 127 SEC
ACTIVATED CLOTTING TIME, LOW RANGE: 222 SEC
ACTIVATED CLOTTING TIME, LOW RANGE: >400 SEC
ACTIVATED CLOTTING TIME, LOW RANGE: >400 SEC
APTT: 33.3 SECONDS (ref 25.1–37.1)
APTT: 58.3 SECONDS (ref 25.1–37.1)
APTT: 76.4 SECONDS (ref 25.1–37.1)
CHOLESTEROL: 177 MG/DL
EKG ATRIAL RATE: 50 BPM
EKG DIAGNOSIS: NORMAL
EKG P AXIS: 60 DEGREES
EKG P-R INTERVAL: 184 MS
EKG Q-T INTERVAL: 406 MS
EKG QRS DURATION: 108 MS
EKG QTC CALCULATION (BAZETT): 370 MS
EKG R AXIS: -44 DEGREES
EKG T AXIS: 17 DEGREES
EKG VENTRICULAR RATE: 50 BPM
ESTIMATED AVERAGE GLUCOSE: 180 MG/DL
GLUCOSE BLD-MCNC: 155 MG/DL (ref 70–99)
GLUCOSE BLD-MCNC: 172 MG/DL (ref 70–99)
GLUCOSE BLD-MCNC: 175 MG/DL (ref 70–99)
GLUCOSE BLD-MCNC: 184 MG/DL (ref 70–99)
GLUCOSE BLD-MCNC: 206 MG/DL (ref 70–99)
HBA1C MFR BLD: 7.9 % (ref 4.2–6.3)
HCT VFR BLD CALC: 47.3 % (ref 42–52)
HCT VFR BLD CALC: 48.1 % (ref 42–52)
HDLC SERPL-MCNC: 32 MG/DL
HEMOGLOBIN: 15.8 GM/DL (ref 13.5–18)
HEMOGLOBIN: 16 GM/DL (ref 13.5–18)
LACTATE: 1.6 MMOL/L (ref 0.4–2)
LDL CHOLESTEROL DIRECT: 99 MG/DL
LV EF: 48 %
LV EF: 55 %
LVEF MODALITY: NORMAL
LVEF MODALITY: NORMAL
MAGNESIUM: 1.8 MG/DL (ref 1.8–2.4)
MCH RBC QN AUTO: 31 PG (ref 27–31)
MCH RBC QN AUTO: 31.3 PG (ref 27–31)
MCHC RBC AUTO-ENTMCNC: 33.3 % (ref 32–36)
MCHC RBC AUTO-ENTMCNC: 33.4 % (ref 32–36)
MCV RBC AUTO: 92.7 FL (ref 78–100)
MCV RBC AUTO: 94.1 FL (ref 78–100)
PDW BLD-RTO: 13 % (ref 11.7–14.9)
PDW BLD-RTO: 13.1 % (ref 11.7–14.9)
PLATELET # BLD: 206 K/CU MM (ref 140–440)
PLATELET # BLD: 211 K/CU MM (ref 140–440)
PMV BLD AUTO: 10.6 FL (ref 7.5–11.1)
PMV BLD AUTO: 10.9 FL (ref 7.5–11.1)
RBC # BLD: 5.1 M/CU MM (ref 4.6–6.2)
RBC # BLD: 5.11 M/CU MM (ref 4.6–6.2)
TRIGL SERPL-MCNC: 382 MG/DL
TROPONIN T: 0.02 NG/ML
TROPONIN T: 0.02 NG/ML
WBC # BLD: 7.8 K/CU MM (ref 4–10.5)
WBC # BLD: 8 K/CU MM (ref 4–10.5)

## 2020-09-29 PROCEDURE — 94761 N-INVAS EAR/PLS OXIMETRY MLT: CPT

## 2020-09-29 PROCEDURE — 4A023N7 MEASUREMENT OF CARDIAC SAMPLING AND PRESSURE, LEFT HEART, PERCUTANEOUS APPROACH: ICD-10-PCS | Performed by: INTERNAL MEDICINE

## 2020-09-29 PROCEDURE — C1887 CATHETER, GUIDING: HCPCS

## 2020-09-29 PROCEDURE — 6360000004 HC RX CONTRAST MEDICATION

## 2020-09-29 PROCEDURE — 2500000003 HC RX 250 WO HCPCS

## 2020-09-29 PROCEDURE — 93458 L HRT ARTERY/VENTRICLE ANGIO: CPT | Performed by: INTERNAL MEDICINE

## 2020-09-29 PROCEDURE — 6360000002 HC RX W HCPCS

## 2020-09-29 PROCEDURE — 82962 GLUCOSE BLOOD TEST: CPT

## 2020-09-29 PROCEDURE — 2140000000 HC CCU INTERMEDIATE R&B

## 2020-09-29 PROCEDURE — 6360000002 HC RX W HCPCS: Performed by: HOSPITALIST

## 2020-09-29 PROCEDURE — C1725 CATH, TRANSLUMIN NON-LASER: HCPCS

## 2020-09-29 PROCEDURE — 84484 ASSAY OF TROPONIN QUANT: CPT

## 2020-09-29 PROCEDURE — 2709999900 HC NON-CHARGEABLE SUPPLY

## 2020-09-29 PROCEDURE — 2500000003 HC RX 250 WO HCPCS: Performed by: HOSPITALIST

## 2020-09-29 PROCEDURE — 6370000000 HC RX 637 (ALT 250 FOR IP): Performed by: INTERNAL MEDICINE

## 2020-09-29 PROCEDURE — 85730 THROMBOPLASTIN TIME PARTIAL: CPT

## 2020-09-29 PROCEDURE — 6370000000 HC RX 637 (ALT 250 FOR IP): Performed by: PHYSICIAN ASSISTANT

## 2020-09-29 PROCEDURE — 83721 ASSAY OF BLOOD LIPOPROTEIN: CPT

## 2020-09-29 PROCEDURE — 99222 1ST HOSP IP/OBS MODERATE 55: CPT | Performed by: INTERNAL MEDICINE

## 2020-09-29 PROCEDURE — 6370000000 HC RX 637 (ALT 250 FOR IP)

## 2020-09-29 PROCEDURE — 93005 ELECTROCARDIOGRAM TRACING: CPT | Performed by: HOSPITALIST

## 2020-09-29 PROCEDURE — C9600 PERC DRUG-EL COR STENT SING: HCPCS

## 2020-09-29 PROCEDURE — B2111ZZ FLUOROSCOPY OF MULTIPLE CORONARY ARTERIES USING LOW OSMOLAR CONTRAST: ICD-10-PCS | Performed by: INTERNAL MEDICINE

## 2020-09-29 PROCEDURE — 85027 COMPLETE CBC AUTOMATED: CPT

## 2020-09-29 PROCEDURE — 85347 COAGULATION TIME ACTIVATED: CPT

## 2020-09-29 PROCEDURE — C1874 STENT, COATED/COV W/DEL SYS: HCPCS

## 2020-09-29 PROCEDURE — 36415 COLL VENOUS BLD VENIPUNCTURE: CPT

## 2020-09-29 PROCEDURE — 83036 HEMOGLOBIN GLYCOSYLATED A1C: CPT

## 2020-09-29 PROCEDURE — 2580000003 HC RX 258: Performed by: INTERNAL MEDICINE

## 2020-09-29 PROCEDURE — 027135Z DILATION OF CORONARY ARTERY, TWO ARTERIES WITH TWO DRUG-ELUTING INTRALUMINAL DEVICES, PERCUTANEOUS APPROACH: ICD-10-PCS | Performed by: INTERNAL MEDICINE

## 2020-09-29 PROCEDURE — 93010 ELECTROCARDIOGRAM REPORT: CPT | Performed by: INTERNAL MEDICINE

## 2020-09-29 PROCEDURE — 6370000000 HC RX 637 (ALT 250 FOR IP): Performed by: HOSPITALIST

## 2020-09-29 PROCEDURE — 93458 L HRT ARTERY/VENTRICLE ANGIO: CPT

## 2020-09-29 PROCEDURE — 80061 LIPID PANEL: CPT

## 2020-09-29 PROCEDURE — C1769 GUIDE WIRE: HCPCS

## 2020-09-29 PROCEDURE — 92928 PRQ TCAT PLMT NTRAC ST 1 LES: CPT | Performed by: INTERNAL MEDICINE

## 2020-09-29 PROCEDURE — 83735 ASSAY OF MAGNESIUM: CPT

## 2020-09-29 PROCEDURE — 83605 ASSAY OF LACTIC ACID: CPT

## 2020-09-29 PROCEDURE — B2161ZZ FLUOROSCOPY OF RIGHT AND LEFT HEART USING LOW OSMOLAR CONTRAST: ICD-10-PCS | Performed by: INTERNAL MEDICINE

## 2020-09-29 PROCEDURE — C1894 INTRO/SHEATH, NON-LASER: HCPCS

## 2020-09-29 PROCEDURE — 93306 TTE W/DOPPLER COMPLETE: CPT

## 2020-09-29 RX ORDER — SODIUM CHLORIDE 0.9 % (FLUSH) 0.9 %
10 SYRINGE (ML) INJECTION PRN
Status: DISCONTINUED | OUTPATIENT
Start: 2020-09-29 | End: 2020-09-30 | Stop reason: HOSPADM

## 2020-09-29 RX ORDER — SODIUM CHLORIDE 0.9 % (FLUSH) 0.9 %
10 SYRINGE (ML) INJECTION EVERY 12 HOURS SCHEDULED
Status: DISCONTINUED | OUTPATIENT
Start: 2020-09-29 | End: 2020-09-30 | Stop reason: HOSPADM

## 2020-09-29 RX ORDER — ATROPINE SULFATE 0.1 MG/ML
INJECTION INTRAVENOUS
Status: DISCONTINUED
Start: 2020-09-29 | End: 2020-09-29 | Stop reason: WASHOUT

## 2020-09-29 RX ORDER — SODIUM CHLORIDE 9 MG/ML
INJECTION, SOLUTION INTRAVENOUS CONTINUOUS
Status: DISCONTINUED | OUTPATIENT
Start: 2020-09-29 | End: 2020-09-30 | Stop reason: HOSPADM

## 2020-09-29 RX ORDER — ACETAMINOPHEN 325 MG/1
650 TABLET ORAL EVERY 4 HOURS PRN
Status: DISCONTINUED | OUTPATIENT
Start: 2020-09-29 | End: 2020-09-30 | Stop reason: HOSPADM

## 2020-09-29 RX ORDER — LANOLIN ALCOHOL/MO/W.PET/CERES
9 CREAM (GRAM) TOPICAL NIGHTLY PRN
Status: DISCONTINUED | OUTPATIENT
Start: 2020-09-29 | End: 2020-09-30 | Stop reason: HOSPADM

## 2020-09-29 RX ORDER — CARVEDILOL 3.12 MG/1
3.12 TABLET ORAL 2 TIMES DAILY WITH MEALS
Status: DISCONTINUED | OUTPATIENT
Start: 2020-09-29 | End: 2020-09-30 | Stop reason: HOSPADM

## 2020-09-29 RX ORDER — SPIRONOLACTONE 25 MG/1
25 TABLET ORAL DAILY
Status: DISCONTINUED | OUTPATIENT
Start: 2020-09-29 | End: 2020-09-30 | Stop reason: HOSPADM

## 2020-09-29 RX ORDER — ALOGLIPTIN 25 MG/1
25 TABLET, FILM COATED ORAL DAILY
Status: DISCONTINUED | OUTPATIENT
Start: 2020-09-29 | End: 2020-09-30 | Stop reason: HOSPADM

## 2020-09-29 RX ORDER — SODIUM CHLORIDE 9 MG/ML
INJECTION, SOLUTION INTRAVENOUS CONTINUOUS
Status: DISPENSED | OUTPATIENT
Start: 2020-09-29 | End: 2020-09-30

## 2020-09-29 RX ORDER — LISINOPRIL 40 MG/1
40 TABLET ORAL DAILY
Status: DISCONTINUED | OUTPATIENT
Start: 2020-09-29 | End: 2020-09-30 | Stop reason: HOSPADM

## 2020-09-29 RX ADMIN — TICAGRELOR 90 MG: 90 TABLET ORAL at 20:39

## 2020-09-29 RX ADMIN — ALOGLIPTIN 25 MG: 25 TABLET, FILM COATED ORAL at 20:39

## 2020-09-29 RX ADMIN — INSULIN GLARGINE 20 UNITS: 100 INJECTION, SOLUTION SUBCUTANEOUS at 20:22

## 2020-09-29 RX ADMIN — DULOXETINE HYDROCHLORIDE 60 MG: 30 CAPSULE, DELAYED RELEASE ORAL at 10:17

## 2020-09-29 RX ADMIN — INSULIN LISPRO 4 UNITS: 100 INJECTION, SOLUTION INTRAVENOUS; SUBCUTANEOUS at 17:34

## 2020-09-29 RX ADMIN — CARVEDILOL 3.12 MG: 3.12 TABLET, FILM COATED ORAL at 20:39

## 2020-09-29 RX ADMIN — OXYBUTYNIN CHLORIDE 5 MG: 5 TABLET ORAL at 20:39

## 2020-09-29 RX ADMIN — OXYBUTYNIN CHLORIDE 5 MG: 5 TABLET ORAL at 17:52

## 2020-09-29 RX ADMIN — INSULIN LISPRO 2 UNITS: 100 INJECTION, SOLUTION INTRAVENOUS; SUBCUTANEOUS at 11:18

## 2020-09-29 RX ADMIN — HEPARIN SODIUM 4000 UNITS: 1000 INJECTION INTRAVENOUS; SUBCUTANEOUS at 01:53

## 2020-09-29 RX ADMIN — SPIRONOLACTONE 25 MG: 25 TABLET ORAL at 20:39

## 2020-09-29 RX ADMIN — SODIUM CHLORIDE: 9 INJECTION, SOLUTION INTRAVENOUS at 22:25

## 2020-09-29 RX ADMIN — ASPIRIN 81 MG CHEWABLE TABLET 81 MG: 81 TABLET CHEWABLE at 10:16

## 2020-09-29 RX ADMIN — OXYBUTYNIN CHLORIDE 5 MG: 5 TABLET ORAL at 10:17

## 2020-09-29 RX ADMIN — OXYBUTYNIN CHLORIDE 5 MG: 5 TABLET ORAL at 01:44

## 2020-09-29 RX ADMIN — INSULIN GLARGINE 20 UNITS: 100 INJECTION, SOLUTION SUBCUTANEOUS at 01:44

## 2020-09-29 RX ADMIN — CLONIDINE HYDROCHLORIDE 0.3 MG: 0.1 TABLET ORAL at 20:37

## 2020-09-29 RX ADMIN — LISINOPRIL 40 MG: 40 TABLET ORAL at 09:08

## 2020-09-29 RX ADMIN — INSULIN LISPRO 2 UNITS: 100 INJECTION, SOLUTION INTRAVENOUS; SUBCUTANEOUS at 07:56

## 2020-09-29 RX ADMIN — MELATONIN TAB 3 MG 9 MG: 3 TAB at 22:00

## 2020-09-29 RX ADMIN — ATORVASTATIN CALCIUM 40 MG: 40 TABLET, FILM COATED ORAL at 10:17

## 2020-09-29 RX ADMIN — HEPARIN SODIUM 12.25 UNITS/KG/HR: 10000 INJECTION, SOLUTION INTRAVENOUS at 12:35

## 2020-09-29 RX ADMIN — SODIUM CHLORIDE: 9 INJECTION, SOLUTION INTRAVENOUS at 17:25

## 2020-09-29 ASSESSMENT — PAIN DESCRIPTION - DESCRIPTORS
DESCRIPTORS: ACHING

## 2020-09-29 ASSESSMENT — PAIN DESCRIPTION - FREQUENCY
FREQUENCY: CONTINUOUS

## 2020-09-29 ASSESSMENT — PAIN DESCRIPTION - LOCATION
LOCATION: CHEST

## 2020-09-29 ASSESSMENT — PAIN DESCRIPTION - PAIN TYPE
TYPE: CHRONIC PAIN

## 2020-09-29 ASSESSMENT — PAIN DESCRIPTION - ONSET
ONSET: GRADUAL
ONSET: ON-GOING
ONSET: GRADUAL
ONSET: ON-GOING
ONSET: GRADUAL

## 2020-09-29 ASSESSMENT — PAIN SCALES - GENERAL
PAINLEVEL_OUTOF10: 0
PAINLEVEL_OUTOF10: 2
PAINLEVEL_OUTOF10: 0
PAINLEVEL_OUTOF10: 0
PAINLEVEL_OUTOF10: 2
PAINLEVEL_OUTOF10: 1
PAINLEVEL_OUTOF10: 3
PAINLEVEL_OUTOF10: 2
PAINLEVEL_OUTOF10: 0
PAINLEVEL_OUTOF10: 2
PAINLEVEL_OUTOF10: 2
PAINLEVEL_OUTOF10: 3

## 2020-09-29 ASSESSMENT — PAIN DESCRIPTION - ORIENTATION
ORIENTATION: LEFT

## 2020-09-29 NOTE — CARE COORDINATION
LSW attempted to see pt regarding discharge plans. Pt was not in room at time of visit. LSW will try again later.

## 2020-09-29 NOTE — PLAN OF CARE
Problem: Pain:  Description: Pain management should include both nonpharmacologic and pharmacologic interventions.   Goal: Pain level will decrease  Description: Pain level will decrease  Outcome: Ongoing  Goal: Control of acute pain  Description: Control of acute pain  Outcome: Ongoing  Goal: Control of chronic pain  Description: Control of chronic pain  Outcome: Ongoing  Goal: Patient's pain/discomfort is manageable  Description: Patient's pain/discomfort is manageable  Outcome: Ongoing     Problem: Cardiac Output - Decreased:  Goal: Hemodynamic stability will improve  Description: Hemodynamic stability will improve  Outcome: Ongoing     Problem: Cardiac:  Goal: Hemodynamic stability will improve  Description: Hemodynamic stability will improve  Outcome: Ongoing  Goal: Ability to maintain an adequate cardiac output will improve  Description: Ability to maintain an adequate cardiac output will improve  Outcome: Ongoing  Goal: Complications related to the disease process, condition or treatment will be avoided or minimized  Description: Complications related to the disease process, condition or treatment will be avoided or minimized  Outcome: Ongoing  Goal: Risk factors for ineffective tissue perfusion will decrease  Description: Risk factors for ineffective tissue perfusion will decrease  Outcome: Ongoing  Goal: Ability to maintain vital signs within normal range will improve  Description: Ability to maintain vital signs within normal range will improve  Outcome: Ongoing  Goal: Cardiovascular alteration will improve  Description: Cardiovascular alteration will improve  Outcome: Ongoing     Problem: Fluid Volume:  Goal: Will show no signs or symptoms of fluid imbalance  Description: Will show no signs or symptoms of fluid imbalance  Outcome: Ongoing     Problem: Infection:  Goal: Will remain free from infection  Description: Will remain free from infection  Outcome: Ongoing     Problem: Safety:  Goal: Free from accidental physical injury  Description: Free from accidental physical injury  Outcome: Ongoing  Goal: Free from intentional harm  Description: Free from intentional harm  Outcome: Ongoing     Problem: Daily Care:  Goal: Daily care needs are met  Description: Daily care needs are met  Outcome: Ongoing     Problem: Skin Integrity:  Goal: Skin integrity will stabilize  Description: Skin integrity will stabilize  Outcome: Ongoing     Problem: Discharge Planning:  Goal: Patients continuum of care needs are met  Description: Patients continuum of care needs are met  Outcome: Ongoing     Problem: Health Behavior:  Goal: Will modify at least one risk factor affecting health status  Description: Will modify at least one risk factor affecting health status  Outcome: Ongoing  Goal: Identification of resources available to assist in meeting health care needs will improve  Description: Identification of resources available to assist in meeting health care needs will improve  Outcome: Ongoing     Problem: Physical Regulation:  Goal: Complications related to the disease process, condition or treatment will be avoided or minimized  Description: Complications related to the disease process, condition or treatment will be avoided or minimized  Outcome: Ongoing     Problem: Physical Regulation:  Goal: Complications related to the disease process, condition or treatment will be avoided or minimized  Description: Complications related to the disease process, condition or treatment will be avoided or minimized  Outcome: Ongoing

## 2020-09-29 NOTE — PLAN OF CARE
Problem: Pain:  Description: Pain management should include both nonpharmacologic and pharmacologic interventions.   Goal: Pain level will decrease  Description: Pain level will decrease  9/29/2020 0457 by Shawn Sanders RN  Outcome: Ongoing  9/29/2020 0116 by Shawn Sanders RN  Outcome: Ongoing  Goal: Control of acute pain  Description: Control of acute pain  9/29/2020 0457 by Shawn Sanders RN  Outcome: Ongoing  9/29/2020 0116 by Shawn Sanders RN  Outcome: Ongoing  Goal: Control of chronic pain  Description: Control of chronic pain  9/29/2020 0457 by Shawn Sanders RN  Outcome: Ongoing  9/29/2020 0116 by Shawn Sanders RN  Outcome: Ongoing  Goal: Patient's pain/discomfort is manageable  Description: Patient's pain/discomfort is manageable  9/29/2020 0457 by Shawn Sanders RN  Outcome: Ongoing  9/29/2020 0116 by Shawn Sanders RN  Outcome: Ongoing     Problem: Cardiac Output - Decreased:  Goal: Hemodynamic stability will improve  Description: Hemodynamic stability will improve  9/29/2020 0457 by Shawn Sanders RN  Outcome: Ongoing  9/29/2020 0116 by Shawn Sanders RN  Outcome: Ongoing     Problem: Cardiac:  Goal: Hemodynamic stability will improve  Description: Hemodynamic stability will improve  9/29/2020 0457 by Shawn Sanders RN  Outcome: Ongoing  9/29/2020 0116 by Shawn Sanders RN  Outcome: Ongoing  Goal: Ability to maintain an adequate cardiac output will improve  Description: Ability to maintain an adequate cardiac output will improve  9/29/2020 0457 by Shawn Sanders RN  Outcome: Ongoing  9/29/2020 0116 by Shawn Sanders RN  Outcome: Ongoing  Goal: Complications related to the disease process, condition or treatment will be avoided or minimized  Description: Complications related to the disease process, condition or treatment will be avoided or minimized  9/29/2020 0457 by Shawn Sanders RN  Outcome: Ongoing  9/29/2020 0116 by Dianne Fuentes RN  Outcome: Ongoing  Goal: Risk factors for ineffective tissue perfusion will decrease  Description: Risk factors for ineffective tissue perfusion will decrease  9/29/2020 0457 by Dianne Fuentes RN  Outcome: Ongoing  9/29/2020 0116 by Dianne Fuentes RN  Outcome: Ongoing  Goal: Ability to maintain vital signs within normal range will improve  Description: Ability to maintain vital signs within normal range will improve  9/29/2020 0457 by Dianne Fuentes RN  Outcome: Ongoing  9/29/2020 0116 by Dianne Fuentes RN  Outcome: Ongoing  Goal: Cardiovascular alteration will improve  Description: Cardiovascular alteration will improve  9/29/2020 0457 by Dianne Fuentes RN  Outcome: Ongoing  9/29/2020 0116 by Dianne Fuentes RN  Outcome: Ongoing     Problem: Fluid Volume:  Goal: Will show no signs or symptoms of fluid imbalance  Description: Will show no signs or symptoms of fluid imbalance  9/29/2020 0457 by Dianne Fuentes RN  Outcome: Ongoing  9/29/2020 0116 by Dianne Fuentes RN  Outcome: Ongoing     Problem: Infection:  Goal: Will remain free from infection  Description: Will remain free from infection  9/29/2020 0457 by Dianne Fuentes RN  Outcome: Ongoing  9/29/2020 0116 by Dianne Fuentes RN  Outcome: Ongoing     Problem: Safety:  Goal: Free from accidental physical injury  Description: Free from accidental physical injury  9/29/2020 0457 by Dianne Fuentes RN  Outcome: Ongoing  9/29/2020 0116 by Dianne Fuentes RN  Outcome: Ongoing  Goal: Free from intentional harm  Description: Free from intentional harm  9/29/2020 0457 by Dianne Fuentes RN  Outcome: Ongoing  9/29/2020 0116 by Dianne Fuentes RN  Outcome: Ongoing     Problem: Daily Care:  Goal: Daily care needs are met  Description: Daily care needs are met  9/29/2020 0457 by Dianne Fuentes RN  Outcome: Ongoing  9/29/2020 0116 by Roosevelt Wen Gerson Metzger RN  Outcome: Ongoing     Problem: Skin Integrity:  Goal: Skin integrity will stabilize  Description: Skin integrity will stabilize  9/29/2020 0457 by Shawn Sanders RN  Outcome: Ongoing  9/29/2020 0116 by Shawn Sanders RN  Outcome: Ongoing     Problem: Discharge Planning:  Goal: Patients continuum of care needs are met  Description: Patients continuum of care needs are met  9/29/2020 0457 by Shawn Sanders RN  Outcome: Ongoing  9/29/2020 0116 by Shawn Sanders RN  Outcome: Ongoing     Problem: Health Behavior:  Goal: Will modify at least one risk factor affecting health status  Description: Will modify at least one risk factor affecting health status  9/29/2020 0457 by Shawn Sanders RN  Outcome: Ongoing  9/29/2020 0116 by Shawn Sanders RN  Outcome: Ongoing  Goal: Identification of resources available to assist in meeting health care needs will improve  Description: Identification of resources available to assist in meeting health care needs will improve  9/29/2020 0457 by Shawn Sanders RN  Outcome: Ongoing  9/29/2020 0116 by Shawn Sanders RN  Outcome: Ongoing     Problem: Physical Regulation:  Goal: Complications related to the disease process, condition or treatment will be avoided or minimized  Description: Complications related to the disease process, condition or treatment will be avoided or minimized  9/29/2020 0457 by Shawn Sanders RN  Outcome: Ongoing  9/29/2020 0116 by Shawn Sanders RN  Outcome: Ongoing

## 2020-09-29 NOTE — CONSULTS
Chart reviewed  Full note to follow  Has Multiple Cardiac risk factors  Now with CP  Has abnormal stress with [facility]  Inf wall ischemia  [facility] later today                      Name:  Anish Andrews /Age/Sex: 1950  (69 y.o. male)   MRN & CSN:  [id number] & [id number] Admission Date/Time: 2020  9:47 PM   Location:  [address] [place] [address] PCP: Homer Dallas MD       Hospital Day: 2          Referring physician:  Felicity Jones MD         Reason for consultation:  CP        Thanks for referral.    Information source: patient    CC;  CP      HPI:   Thank you for involving me in taking  care of Anish Andrews who  is a 69 y. o.year  Old male  Presents with  H/o HTN, hyperlipidimea, DM    Has been having recurrent mid sternal uneasiness to LUE for  afew weeks. Has a bnormal cardiolite from [facility], pt has images, has inf wall ischemia. 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 Past surgical history:   has a past surgical history that includes back surgery; TURP; Pilonidal cyst excision; Colonoscopy; TURP (); Pilonidal cyst excision (); and laminectomy (12). Social History:   reports that he quit smoking about 31 years ago. He has a 50.00 pack-year smoking history. He has never used smokeless tobacco. He reports current alcohol use. He reports that he does not use drugs. Family history:  family history includes COPD in his sister; Cancer in his brother, brother, father, and another family member; Diabetes in his maternal aunt, maternal aunt, mother, and another family member; Heart Attack in his sister;  Heart Disease in his brother, maternal aunt, and mother; Heart Failure (age of onset: 52) in his brother; High Blood Pressure in his mother; Other in his paternal grandmother; Other (age of onset: 47) in his mother.     Allergies   Allergen Reactions    Celebrex [Celecoxib]      From old chart     Cialis [Tadalafil]      From old chart     Duloxetine Hcl Other (See Comments)     Caused depression    Gabapentin Other (See Comments)     Caused depression    Lyrica [Pregabalin]      Confusion      Pravastatin      From old chart     Zetia [Ezetimibe]      From old chart        lisinopril (PRINIVIL;ZESTRIL) tablet 40 mg, Daily  aspirin chewable tablet 81 mg, Daily  atorvastatin (LIPITOR) tablet 40 mg, Daily  DULoxetine (CYMBALTA) extended release capsule 60 mg, Daily  insulin glargine (LANTUS) injection vial 20 Units, Nightly  oxybutynin (DITROPAN) tablet 5 mg, TID  sodium chloride flush 0.9 % injection 10 mL, 2 times per day  sodium chloride flush 0.9 % injection 10 mL, PRN  acetaminophen (TYLENOL) tablet 650 mg, Q6H PRN    Or  acetaminophen (TYLENOL) suppository 650 mg, Q6H PRN  polyethylene glycol (GLYCOLAX) packet 17 g, Daily PRN  promethazine (PHENERGAN) tablet 12.5 mg, Q6H PRN    Or  ondansetron (ZOFRAN) injection 4 mg, Q6H PRN  potassium chloride (KLOR-CON M) extended release tablet 40 mEq, PRN    Or  potassium bicarb-citric acid (EFFER-K) effervescent tablet 40 mEq, PRN    Or  potassium chloride 10 mEq/100 mL IVPB (Peripheral Line), PRN  magnesium sulfate 2 g in 50 mL IVPB premix, PRN  heparin (porcine) injection 4,000 Units, PRN  heparin (porcine) injection 2,000 Units, PRN  heparin 25,000 unit in sodium chloride 0.45% 250 mL infusion, Continuous  nitroGLYCERIN (NITROSTAT) SL tablet 0.4 mg, Q5 Min PRN  glucose (GLUTOSE) 40 % oral gel 15 g, PRN  dextrose 50 % IV solution, PRN  glucagon (rDNA) injection 1 mg, PRN  dextrose 5 % solution, PRN  insulin lispro (HUMALOG) injection vial 0-12 Units, TID WC  insulin lispro (HUMALOG) injection vial 0-6 Units, Nightly      Current Facility-Administered Medications   Medication Dose Route Frequency Provider Last Rate Last Dose    lisinopril (PRINIVIL;ZESTRIL) tablet 40 mg  40 mg Oral Daily Devota Median, MD   40 mg at 09/29/20 0908    aspirin chewable tablet 81 mg  81 mg Oral Daily Devota Median, MD   81 mg at 09/29/20 1016    atorvastatin (LIPITOR) tablet 40 mg  40 mg Oral Daily Devota Median, MD   40 mg at 09/29/20 1017    DULoxetine (CYMBALTA) extended release capsule 60 mg  60 mg Oral Daily Devota Median, MD   60 mg at 09/29/20 1017    insulin glargine (LANTUS) injection vial 20 Units  20 Units Subcutaneous Nightly Devota Median, MD   20 Units at 09/29/20 0144    oxybutynin (DITROPAN) tablet 5 mg  5 mg Oral TID Devota Median, MD   5 mg at 09/29/20 1017    sodium chloride flush 0.9 % injection 10 mL  10 mL Intravenous 2 times per day Devoctavia Median, MD        sodium chloride flush 0.9 % injection 10 mL  10 mL Intravenous PRN Devota Median, MD        acetaminophen (TYLENOL) tablet 650 mg  650 mg Oral Q6H PRN Devota MedianMD [place] acetaminophen (TYLENOL) suppository 650 mg  650 mg Rectal Q6H PRN Devota MD Xcohitl        polyethylene glycol (GLYCOLAX) packet 17 g  17 g Oral Daily PRN Devota MD Xochitl        promethazine (PHENERGAN) tablet 12.5 mg  12.5 mg Oral Q6H PRN Devota MD Xochitl        Or    ondansetron [facility] injection 4 mg  4 mg Intravenous Q6H PRN Devota Median, MD        potassium chloride (KLOR-CON M) extended release tablet 40 mEq  40 mEq Oral PRN Devota MD Xochitl        Or    potassium bicarb-citric acid (EFFER-K) effervescent tablet 40 mEq  40 mEq Oral PRN Devota Median, MD [place] G[place]ra potassium chloride 10 mEq/100 mL IVPB (Peripheral Line)  10 mEq Intravenous PRN Devota Median, MD        magnesium sulfate 2 g in 50 mL IVPB premix  2 g Intravenous PRN Devota Median, MD        heparin (porcine) injection 4,000 Units  4,000 Units Intravenous PRN Devota Median, MD   4,000 Units at 09/29/20 0153    heparin (porcine) injection 2,000 Units  2,000 Units Intravenous PRN Xavi Hutchins MD        heparin 25,000 unit in sodium chloride 0.45% 250 mL infusion  8.3 Units/kg/hr Intravenous Continuous Xavi Hutchins MD 14.8 mL/hr at 09/29/20 1235 12.25 Units/kg/hr at 09/29/20 1235    nitroGLYCERIN (NITROSTAT) SL tablet 0.4 mg  0.4 mg Sublingual Q5 Min PRN Xavi Hutchins MD        glucose (GLUTOSE) 40 % oral gel 15 g  15 g Oral PRN Xavi Hutchins MD        dextrose 50 % IV solution  12.5 g Intravenous PRN Xavi Hutchins MD        glucagon (rDNA) injection 1 mg  1 mg Intramuscular PRN Xavi Hutchins MD        dextrose 5 % solution  100 mL/hr Intravenous PRN Xavi Hutchins MD        insulin lispro (HUMALOG) injection vial 0-12 Units  0-12 Units Subcutaneous TID WC Xavi Hutchins MD   2 Units at 09/29/20 1118    insulin lispro (HUMALOG) injection vial 0-6 Units  0-6 Units Subcutaneous Nightly Xavi Hutchins MD   1 Units at 09/29/20 0144     Review of Systems:  All 14 systems reviewed, all negative except for  CP    Physical Examination:    /80   Pulse 79   Temp 98.7 °F (37.1 °C) (Oral)   Resp 12   Wt 261 lb 7 oz (118.6 kg)   SpO2 94%   BMI 36.46 kg/m²      Wt Readings from Last 3 Encounters:   09/29/20 261 lb 7 oz (118.6 kg)   09/28/20 267 lb (121.1 kg)   07/11/19 286 lb (129.7 kg)     Body mass index is 36.46 kg/m². General Appearance:  fair  Head: normocephalic     Eyes: normal, noninjected conjunctiva    ENT: normal mucosa, noninjected throat, normal     NECK: No JVP  No thyromegaly        Cardiovascular: No thrills palpated   Auscultation: Normal S1 and S2,  no murmur   carotid bruit no   Abdominal Aorta no bruit    Respiratory:    Breath sounds Clear = 0    Extremities:  none Edema clubbing ,   no cyanosis    SKIN: Warm and well perfused, no pallor or cyanosis    Vascular exam:  Pedal Pulses: palp  bilaterally        Abdomen:  No masses or tenderness. No organomegaly noted.     Neurological:  Oriented to time, place, and person   No focal neurological deficit noted. Psychiatric:normal mood, no anxiety    Lab Review   Recent Labs     09/29/20  0610   WBC 7.8   HGB 15.8   HCT 47.3         Recent Labs     09/28/20  1320      K 4.1   CL 96*   CO2 29   BUN 32*   CREATININE 1.1     No results for input(s): AST, ALT, ALB, BILIDIR, BILITOT, ALKPHOS in the last 72 hours. No results for input(s): TROPONINI in the last 72 hours.   No results found for: BNP  Lab Results   Component Value Date    INR 0.99 05/10/2013    PROTIME 11.3 05/10/2013           Assessment/Recommendations:     - CP; serial trops, EKGs, LHC later today  - HTN stable  - hyperlipidimea on statin  - DM on meds  - wt loss         Bernie Enciso MD, 9/29/2020 1:30 PM

## 2020-09-29 NOTE — H&P
[address] HOSPITALIST History & Physical      PCP: González Amador MD    Date of Admission: 9/28/2020    of Service: Pt seen/examined on 09/28/20 and Admitted to Inpatient    Hx taken from patient    Chief Complaint: Chest pain  History Of Present Illness: The patient is a 69 y.o. male PMHx Neuropathy, diabetes mellitus, HTN  Transferred from [facility]. Patient states that for the last 4 months he has had an uneasy feeling in his chest.  Feeling was on the left side of his chest and was constant and did not radiate. He also had some shortness of breath. Over the last 4 months the shortness of breath was worsening. He felt like he was running out of air and he would have to catch his breath after walking only 75 feet. He had a stress test on 9/17 which showed a moderate size reversible wall abnormality from the apex to the mid cavity. Before patient could get his appointment with the cardiologist he started feeling chest pain on the right side. This started on Saturday. He describes a sensation like a vibration. This sensation stays in one spot and is also constant. He says nothing helps. Nothing worsens. He states that he occasionally does get clammy and sweaty with exertion. Patient states he quit smoking 30 years ago. He does have hypertension, diabetes, and hyperlipidemia. He also has a family history of MI. In the ER patient had a slightly elevated troponin. He was started on heparin drip.     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, with long-term current use of insulin ([address] 9/27/2016    Erectile dysfunction     Hearing impairment     pt wears hearing aids    Hyperlipidemia     Hypertension     Incontinent of urine     Neuropathy 1/22/2016    Obesity     Uses hearing aid     right ear       PastSurgical History:        Procedure Laterality Date    BACK SURGERY      COLONOSCOPY      LAMINECTOMY  5/20/12    L3-4 L4-5 decom ryan    PILONIDAL CYST EXCISION      PILONIDAL CYST EXCISION  1975    TURP      TURP  2005       Medications Prior to Admission:    Prior to Admission medications    Medication Sig Start Date End Date Taking? Authorizing Provider   cloNIDine (CATAPRES) 0.3 MG tablet Take 0.3 mg by mouth 2 times daily    Historical Provider, MD   glipiZIDE (GLUCOTROL) 5 MG tablet Take 5 mg by mouth 2 times daily (before meals)    Historical Provider, MD   insulin glargine (LANTUS) 100 UNIT/ML injection vial Inject 20 Units into the skin nightly    Historical Provider, MD   DULoxetine (CYMBALTA) 30 MG extended release capsule Take 30 mg by mouth daily    Historical Provider, MD   alogliptin (NESINA) 25 MG TABS tablet Take 25 mg by mouth daily    Historical Provider, MD   ibuprofen (ADVIL;MOTRIN) 800 MG tablet Take 1 tablet by mouth 2 times daily as needed for Pain 7/11/19   Mana Ortiz MD   metFORMIN (GLUCOPHAGE) 500 MG tablet Take 1 tablet by mouth 2 times daily (with meals)  Patient taking differently: Take 1,000 mg by mouth 2 times daily (with meals)  4/3/17   Wallace Vargas MD   benazepril (LOTENSIN) 40 MG tablet Take 1 tablet by mouth daily 3/30/17   Wallace Vargas MD   oxybutynin (DITROPAN) 5 MG tablet Take 1 tablet by mouth 3 times daily 3/30/17   Wallace Vargas MD   atorvastatin (LIPITOR) 20 MG tablet Take 1 tablet by mouth daily  Patient taking differently: Take 40 mg by mouth daily  12/20/16   Wallace Vargas MD   chlorthalidone (HYGROTON) 25 MG tablet Take 25 mg by mouth daily    Historical Provider, MD   spironolactone (ALDACTONE) 25 MG tablet Take 1 tablet by mouth daily 4/28/16   MARILYN Ramirez CNP   Blood Glucose Monitoring Suppl [facility] BLOOD GLUCOSE METER) W/DEVICE KIT 1 kit by Does not apply route once for 1 dose 4/28/16 9/28/20  MARILYN Ramirez CNP       Allergies:    Celebrex [celecoxib]; Cialis [tadalafil];  Duloxetine hcl; Gabapentin; Lyrica [pregabalin]; Pravastatin; and Zetia [ezetimibe]    Social History:    The patient currently lives at home with wife. Independent. Uses cane. TOBACCO:   reports that he quit smoking about 31 years ago. He has a 50.00 pack-year smoking history. He has never used smokeless tobacco.  ETOH:   reports current alcohol use. History:  Positive as follows:        Problem Relation Age of Onset    Heart Disease Mother     Diabetes Mother     Cancer Father         renal    Heart Disease Brother         had heart transplant     Cancer Other     Other Mother 47        aortic aneurysm     High Blood Pressure Mother     Heart Failure Brother 52    Cancer Brother     Heart Attack Sister     COPD Sister     Cancer Brother         colon    Other Paternal Grandmother         hemorrhaged to death    Diabetes Maternal Aunt     Diabetes Maternal Aunt     Heart Disease Maternal Aunt     Diabetes Other        REVIEW OF SYSTEMS:   Pertinent positives and negatives as noted in the HPI and ROS. All other systems reviewed and negative. Review of Systems   Constitutional: Positive for diaphoresis. Negative for chills and fever. HENT: Negative for postnasal drip, rhinorrhea and sore throat. Eyes: Negative for discharge and itching. Respiratory: Positive for shortness of breath. Negative for cough, chest tightness and wheezing. Cardiovascular: Positive for chest pain and leg swelling. Negative for palpitations. Gastrointestinal: Negative for abdominal pain, blood in stool, diarrhea and vomiting. Genitourinary: Negative for dysuria and hematuria. Incontinent   Musculoskeletal: Negative for arthralgias, back pain and neck pain. Skin: Negative for rash and wound. Neurological: Negative for dizziness and light-headedness. Psychiatric/Behavioral: Negative for confusion. The patient is nervous/anxious.         PHYSICAL EXAM:  /73   Pulse 54   Temp 98.4 °F (36.9 °C) (Oral)   Resp 10   Physical Exam  Constitutional:       General: He is not in acute distress. Appearance: Normal appearance. He is not ill-appearing, toxic-appearing or diaphoretic. HENT:      Head: Normocephalic and atraumatic. Nose: No rhinorrhea. Mouth/Throat:      Mouth: Mucous membranes are dry. Eyes:      General:         Right eye: No discharge. Left eye: No discharge. Neck:      Musculoskeletal: No muscular tenderness. Cardiovascular:      Rate and Rhythm: Regular rhythm. Bradycardia present. Pulses: Normal pulses. Heart sounds: Normal heart sounds. No murmur. No friction rub. No gallop. Pulmonary:      Effort: Pulmonary effort is normal. No respiratory distress. Breath sounds: Normal breath sounds. No wheezing, rhonchi or rales. Chest:      Chest wall: No tenderness. Abdominal:      General: Abdomen is flat. There is no distension. Palpations: Abdomen is soft. Tenderness: There is no abdominal tenderness. There is no guarding or rebound. Musculoskeletal: Normal range of motion. General: No swelling. Right lower leg: No edema. Left lower leg: No edema. Lymphadenopathy:      Cervical: No cervical adenopathy. Skin:     General: Skin is warm. Findings: No lesion or rash. Neurological:      General: No focal deficit present. Mental Status: He is alert. Mental status is at baseline. Cranial Nerves: No cranial nerve deficit. Psychiatric:         Mood and Affect: Mood normal.         Behavior: Behavior normal.         Thought Content:  Thought content normal.         Judgment: Judgment normal.           Imaging:    Xr Chest Portable    Result Date: 9/28/2020  EXAMINATION: ONE XRAY VIEW OF THE CHEST 9/28/2020 1:24 pm COMPARISON: 07/11/2019 HISTORY: ORDERING SYSTEM PROVIDED HISTORY: CP TECHNOLOGIST PROVIDED HISTORY: Reason for exam:->CP Reason for Exam: chest pain Acuity: Acute Type of Exam: Initial Additional signs and symptoms: chest pain Relevant Medical/Surgical History: chest pain FINDINGS: Normal heart size and pulmonary vasculature. No focal consolidations, pleural effusions, or pneumothorax. No evidence of acute process. EKG:   Normal sinus rhythm    CBC   Recent Labs     09/28/20  1320   WBC 5.9   HGB 16.2   HCT 46.6         RENAL  Recent Labs     09/28/20  1320      K 4.1   CL 96*   CO2 29   BUN 32*   CREATININE 1.1     LFT'S  No results for input(s): AST, ALT, ALB, BILIDIR, BILITOT, ALKPHOS in the last 72 hours. COAG  No results for input(s): INR in the last 72 hours. CARDIAC ENZYMES  Recent Labs     09/28/20  1320   TROPONINT 0.024*       U/A:    Lab Results   Component Value Date    COLORU YELLOW 07/11/2019    WBCUA 0 TO 1 07/11/2019    RBCUA NO CELLS SEEN 07/11/2019    MUCUS NEGATIVE 07/11/2019    BACTERIA RARE 07/11/2019    CLARITYU CLEAR 07/11/2019    SPECGRAV 1.010 07/11/2019    LEUKOCYTESUR NEGATIVE 07/11/2019    BLOODU NEGATIVE 07/11/2019    GLUCOSEU 100 02/06/2017       ABG  No results found for: HBM0PVW, BEART, X1IALBBG, PHART, THGBART, FZN1DBG, PO2ART, CXL5ACL        Active Hospital Problems    Diagnosis Date Noted    NSTEMI (non-ST elevated myocardial infarction) ([facility] [address] [I21.4] 84/03/2694       CERTIFICATION    I certify that Pat Vera is expected to be hospitalized for >2 midnights based on the following assessment and plan:    ASSESSMENT/PLAN:    1. Chest pain  -concern for unstable angina. Patient has an abnormal stress test. Chest x-ray showed no acute process. Consult cardiology. Patient on heparin drip. On aspirin and statin. Cannot do beta-blocker at this time due to low heart rate. Check serial troponins. Nitroglycerin as needed. 2. Diabetes mellitus  -resume home Lantus. Hold oral diabetic medications. Placed on sliding scale insulin. 3. Hypertension  -resume home ACE inhibitor. Hold on other BP meds since blood pressure is normal at this time.       DVT Prophylaxis: Heparin drip  Diet: No diet orders on file  Code Status: Full Code  POA: Wife - Alejandra Perez MD

## 2020-09-29 NOTE — PLAN OF CARE
Problem: Pain:  Goal: Pain level will decrease  Description: Pain level will decrease  9/29/2020 1944 by Joshua Zacarias  Outcome: Ongoing  9/29/2020 0911 by Jose Roberto Scott RN  Outcome: Ongoing  Goal: Control of acute pain  Description: Control of acute pain  9/29/2020 1944 by Joshua Zacarias  Outcome: Ongoing  9/29/2020 0911 by Jose Roberto Scott RN  Outcome: Ongoing  Goal: Control of chronic pain  Description: Control of chronic pain  9/29/2020 1944 by Joshua Zacarias  Outcome: Ongoing  9/29/2020 0911 by Jose Roberto Scott RN  Outcome: Ongoing  Goal: Patient's pain/discomfort is manageable  Description: Patient's pain/discomfort is manageable  9/29/2020 1944 by Joshua Zacarias  Outcome: Ongoing  9/29/2020 0911 by Jose Roberto Scott RN  Outcome: Ongoing     Problem: Cardiac Output - Decreased:  Goal: Hemodynamic stability will improve  Description: Hemodynamic stability will improve  9/29/2020 1944 by Joshua Zacarias  Outcome: Ongoing  9/29/2020 0911 by Jose Roberto Scott RN  Outcome: Ongoing     Problem: Cardiac:  Goal: Hemodynamic stability will improve  Description: Hemodynamic stability will improve  9/29/2020 1944 by Joshua Zacarias  Outcome: Ongoing  9/29/2020 0911 by Jose Roberto Scott RN  Outcome: Ongoing  Goal: Ability to maintain an adequate cardiac output will improve  Description: Ability to maintain an adequate cardiac output will improve  9/29/2020 1944 by Joshua Zacarias  Outcome: Ongoing  9/29/2020 0911 by Jose Roberto Scott RN  Outcome: Ongoing  Goal: Complications related to the disease process, condition or treatment will be avoided or minimized  Description: Complications related to the disease process, condition or treatment will be avoided or minimized  9/29/2020 [address] by [address]  Outcome: Ongoing  9/29/2020 0911 by Jose Roberto Scott RN  Outcome: Ongoing  Goal: Risk factors for ineffective tissue perfusion will decrease  Description: Risk factors for ineffective tissue perfusion will decrease  9/29/2020 1944 by Benedicto Zacairas  Outcome: Ongoing  9/29/2020 0911 by Melissa Ma RN  Outcome: Ongoing  Goal: Ability to maintain vital signs within normal range will improve  Description: Ability to maintain vital signs within normal range will improve  9/29/2020 1944 by Benedicto Zacarias  Outcome: Ongoing  9/29/2020 0911 by Melissa Ma RN  Outcome: Ongoing  Goal: Cardiovascular alteration will improve  Description: Cardiovascular alteration will improve  9/29/2020 1944 by Benedicto Zacarias  Outcome: Ongoing  9/29/2020 0911 by Melissa Ma RN  Outcome: Ongoing     Problem: Fluid Volume:  Goal: Will show no signs or symptoms of fluid imbalance  Description: Will show no signs or symptoms of fluid imbalance  9/29/2020 1944 by Benedicto Zacarias  Outcome: Ongoing  9/29/2020 0911 by Melissa Ma RN  Outcome: Ongoing     Problem: Infection:  Goal: Will remain free from infection  Description: Will remain free from infection  9/29/2020 1944 by Benedicto Zacarias  Outcome: Ongoing  9/29/2020 0911 by Melissa Ma RN  Outcome: Ongoing     Problem: Safety:  Goal: Free from accidental physical injury  Description: Free from accidental physical injury  9/29/2020 1944 by Benedicto Zacarias  Outcome: Ongoing  9/29/2020 0911 by Melissa Ma RN  Outcome: Ongoing  Goal: Free from intentional harm  Description: Free from intentional harm  9/29/2020 1944 by [address]  Outcome: Ongoing  9/29/2020 0911 by Melissa Ma RN  Outcome: Ongoing     Problem: Daily Care:  Goal: Daily care needs are met  Description: Daily care needs are met  9/29/2020 1944 by [address]  Outcome: Ongoing  9/29/2020 0911 by Melissa Ma RN  Outcome: Ongoing     Problem: Skin Integrity:  Goal: Skin integrity will stabilize  Description: Skin integrity will stabilize  9/29/2020 1944 by [address]  Outcome: Ongoing  9/29/2020 0911 by Melissa Ma

## 2020-09-29 NOTE — PLAN OF CARE
Cary Pacheco RN  Outcome: Ongoing  Goal: Complications related to the disease process, condition or treatment will be avoided or minimized  Description: Complications related to the disease process, condition or treatment will be avoided or minimized  9/29/2020 0911 by Hemant Adair RN  Outcome: Ongoing  9/29/2020 0457 by Bolling Leventhal, RN  Outcome: Ongoing  9/29/2020 0116 by Bolling Leventhal, RN  Outcome: Ongoing  Goal: Risk factors for ineffective tissue perfusion will decrease  Description: Risk factors for ineffective tissue perfusion will decrease  9/29/2020 0911 by Hemant Adair RN  Outcome: Ongoing  9/29/2020 0457 by Bolling Leventhal, RN  Outcome: Ongoing  9/29/2020 0116 by Bolling Leventhal, RN  Outcome: Ongoing  Goal: Ability to maintain vital signs within normal range will improve  Description: Ability to maintain vital signs within normal range will improve  9/29/2020 0911 by Hemant Adair RN  Outcome: Ongoing  9/29/2020 0457 by Bolling Leventhal, RN  Outcome: Ongoing  9/29/2020 0116 by Bolling Leventhal, RN  Outcome: Ongoing  Goal: Cardiovascular alteration will improve  Description: Cardiovascular alteration will improve  9/29/2020 0911 by Hemant Adair RN  Outcome: Ongoing  9/29/2020 0457 by Bolling Leventhal, RN  Outcome: Ongoing  9/29/2020 0116 by Bolling Leventhal, RN  Outcome: Ongoing     Problem: Fluid Volume:  Goal: Will show no signs or symptoms of fluid imbalance  Description: Will show no signs or symptoms of fluid imbalance  9/29/2020 0911 by Hemant Adair RN  Outcome: Ongoing  9/29/2020 0457 by Bolling Leventhal, RN  Outcome: Ongoing  9/29/2020 0116 by Bolling Leventhal, RN  Outcome: Ongoing     Problem: Infection:  Goal: Will remain free from infection  Description: Will remain free from infection  9/29/2020 0911 by Hemant Adair RN  Outcome: Ongoing  9/29/2020 0457 by Bolling Leventhal, RN  Outcome: Ongoing  9/29/2020 0116 by Prem Gamboa Remi Alaniz RN  Outcome: Ongoing     Problem: Safety:  Goal: Free from accidental physical injury  Description: Free from accidental physical injury  9/29/2020 0911 by Dashawn Maddox RN  Outcome: Ongoing  9/29/2020 0457 by Jm Kimball RN  Outcome: Ongoing  9/29/2020 0116 by Jm Kimball RN  Outcome: Ongoing  Goal: Free from intentional harm  Description: Free from intentional harm  9/29/2020 0911 by Dashawn Maddox RN  Outcome: Ongoing  9/29/2020 0457 by Jm Kimball RN  Outcome: Ongoing  9/29/2020 0116 by Jm Kimball RN  Outcome: Ongoing     Problem: Daily Care:  Goal: Daily care needs are met  Description: Daily care needs are met  9/29/2020 0911 by Dashawn Maddox RN  Outcome: Ongoing  9/29/2020 0457 by Jm Kimball RN  Outcome: Ongoing  9/29/2020 0116 by Jm Kimball RN  Outcome: Ongoing     Problem: Skin Integrity:  Goal: Skin integrity will stabilize  Description: Skin integrity will stabilize  9/29/2020 0911 by Dashawn Maddox RN  Outcome: Ongoing  9/29/2020 0457 by Jm Kimball RN  Outcome: Ongoing  9/29/2020 0116 by Jm Kimball RN  Outcome: Ongoing     Problem: Discharge Planning:  Goal: Patients continuum of care needs are met  Description: Patients continuum of care needs are met  9/29/2020 0911 by Dashawn Maddox RN  Outcome: Ongoing  9/29/2020 0457 by Jm Kimball RN  Outcome: Ongoing  9/29/2020 0116 by Jm Kimball RN  Outcome: Ongoing     Problem: Health Behavior:  Goal: Will modify at least one risk factor affecting health status  Description: Will modify at least one risk factor affecting health status  9/29/2020 0911 by Dashawn Maddox RN  Outcome: Ongoing  9/29/2020 0457 by Jm Kimball RN  Outcome: Ongoing  9/29/2020 0116 by Jm Kimball RN  Outcome: Ongoing  Goal: Identification of resources available to assist in meeting health care needs will improve  Description: Identification of resources available to assist in meeting health care needs will improve  9/29/2020 0911 by Nancy Durham RN  Outcome: Ongoing  9/29/2020 0457 by Matt Crane RN  Outcome: Ongoing  9/29/2020 0116 by Matt Crane RN  Outcome: Ongoing     Problem: Physical Regulation:  Goal: Complications related to the disease process, condition or treatment will be avoided or minimized  Description: Complications related to the disease process, condition or treatment will be avoided or minimized  9/29/2020 0911 by Nancy Durham RN  Outcome: Ongoing  9/29/2020 0457 by Matt Crane RN  Outcome: Ongoing  9/29/2020 0116 by Matt Crane RN  Outcome: Ongoing

## 2020-09-29 NOTE — PLAN OF CARE
Problem: Pain:  Goal: Pain level will decrease  Description: Pain level will decrease  9/29/2020 0911 by Raegan Jones RN  Outcome: Ongoing  9/29/2020 0457 by Janusz Fernandez RN  Outcome: Ongoing  9/29/2020 0116 by Janusz Fernandez RN  Outcome: Ongoing

## 2020-09-29 NOTE — PROGRESS NOTES
extended release tablet 40 mEq  40 mEq Oral PRN Gabriel Choudhary MD        Or    potassium bicarb-citric acid (EFFER-K) effervescent tablet 40 mEq  40 mEq Oral PRN Gabriel Choudhary MD        Or   [facility] potassium chloride 10 mEq/100 mL IVPB (Peripheral Line)  10 mEq Intravenous PRN Gabriel Choudhary MD        magnesium sulfate 2 g in 50 mL IVPB premix  2 g Intravenous PRN Gabriel Choudhary MD        heparin (porcine) injection 4,000 Units  4,000 Units Intravenous PRN Gabriel Choudhary MD   4,000 Units at 09/29/20 0153    heparin (porcine) injection 2,000 Units  2,000 Units Intravenous PRN Gabriel Choudhary MD        heparin 25,000 unit in sodium chloride 0.45% 250 mL infusion  8.3 Units/kg/hr Intravenous Continuous Gabriel Choudhary MD 14.8 mL/hr at 09/29/20 1235 12.25 Units/kg/hr at 09/29/20 1235    nitroGLYCERIN (NITROSTAT) SL tablet 0.4 mg  0.4 mg Sublingual Q5 Min PRN Gabriel Choudhary MD        glucose (GLUTOSE) 40 % oral gel 15 g  15 g Oral PRN Gabriel Choudhary MD        dextrose 50 % IV solution  12.5 g Intravenous PRN Gabriel Choudhary MD        glucagon (rDNA) injection 1 mg  1 mg Intramuscular PRN Gabriel Choudhary MD        dextrose 5 % solution  100 mL/hr Intravenous PRN Gabriel Choudhary MD        insulin lispro (HUMALOG) injection vial 0-12 Units  0-12 Units Subcutaneous TID WC Gabriel Choudhary MD   2 Units at 09/29/20 1118    insulin lispro (HUMALOG) injection vial 0-6 Units  0-6 Units Subcutaneous Nightly Gabriel Choudhary MD   1 Units at 09/29/20 0144       Subjective:     Patient reports intermittent chest pain.   On heparin drip and n.p.o. status for cardiac cath    Objective:   No intake or output data in the 24 hours ending 09/29/20 1351   Vitals:   Vitals:    09/29/20 1230   BP: 129/80   Pulse: 79   Resp: 12   Temp: 98.7 °F (37.1 °C)   SpO2: 94%     Physical Exam:  General Appearance:    Alert, cooperative, no distress  Head:      Normocephalic, without obvious abnormality, atraumatic  Eyes:       Conjunctiva/corneas clear, EOM's intact  Lungs:    Clear pain Relevant Medical/Surgical History: chest pain FINDINGS: Normal heart size and pulmonary vasculature. No focal consolidations, pleural effusions, or pneumothorax. No evidence of acute process.            Silvia Gamez  [facility]

## 2020-09-29 NOTE — PROGRESS NOTES
Pt arrived to unit via stretcher. Pt oriented to room, admission assessment complete and questions and concerns welcomed and addressed. Skin assessment complete with With Colleen GARCIA. Pt skin is warm dry and intact with no open areas noted.

## 2020-09-30 VITALS
DIASTOLIC BLOOD PRESSURE: 69 MMHG | RESPIRATION RATE: 14 BRPM | HEART RATE: 54 BPM | TEMPERATURE: 97.6 F | WEIGHT: 259.9 LBS | BODY MASS INDEX: 36.39 KG/M2 | SYSTOLIC BLOOD PRESSURE: 119 MMHG | HEIGHT: 71 IN | OXYGEN SATURATION: 97 %

## 2020-09-30 LAB
ANION GAP SERPL CALCULATED.3IONS-SCNC: 11 MMOL/L (ref 4–16)
APTT: 30 SECONDS (ref 25.1–37.1)
APTT: 35 SECONDS (ref 25.1–37.1)
BUN BLDV-MCNC: 24 MG/DL (ref 6–23)
CALCIUM SERPL-MCNC: 9.6 MG/DL (ref 8.3–10.6)
CHLORIDE BLD-SCNC: 98 MMOL/L (ref 99–110)
CO2: 27 MMOL/L (ref 21–32)
CREAT SERPL-MCNC: 1.2 MG/DL (ref 0.9–1.3)
EKG ATRIAL RATE: 50 BPM
EKG ATRIAL RATE: 86 BPM
EKG DIAGNOSIS: NORMAL
EKG DIAGNOSIS: NORMAL
EKG P AXIS: 40 DEGREES
EKG P AXIS: 64 DEGREES
EKG P-R INTERVAL: 190 MS
EKG P-R INTERVAL: 200 MS
EKG Q-T INTERVAL: 340 MS
EKG Q-T INTERVAL: 442 MS
EKG QRS DURATION: 100 MS
EKG QRS DURATION: 122 MS
EKG QTC CALCULATION (BAZETT): 402 MS
EKG QTC CALCULATION (BAZETT): 406 MS
EKG R AXIS: -40 DEGREES
EKG R AXIS: -51 DEGREES
EKG T AXIS: 23 DEGREES
EKG T AXIS: 30 DEGREES
EKG VENTRICULAR RATE: 50 BPM
EKG VENTRICULAR RATE: 86 BPM
GFR AFRICAN AMERICAN: >60 ML/MIN/1.73M2
GFR NON-AFRICAN AMERICAN: 60 ML/MIN/1.73M2
GLUCOSE BLD-MCNC: 189 MG/DL (ref 70–99)
GLUCOSE BLD-MCNC: 193 MG/DL (ref 70–99)
HCT VFR BLD CALC: 46.3 % (ref 42–52)
HEMOGLOBIN: 15.7 GM/DL (ref 13.5–18)
MCH RBC QN AUTO: 31.2 PG (ref 27–31)
MCHC RBC AUTO-ENTMCNC: 33.9 % (ref 32–36)
MCV RBC AUTO: 92 FL (ref 78–100)
PDW BLD-RTO: 13.2 % (ref 11.7–14.9)
PLATELET # BLD: 220 K/CU MM (ref 140–440)
PMV BLD AUTO: 10.3 FL (ref 7.5–11.1)
POTASSIUM SERPL-SCNC: 4.1 MMOL/L (ref 3.5–5.1)
RBC # BLD: 5.03 M/CU MM (ref 4.6–6.2)
SODIUM BLD-SCNC: 136 MMOL/L (ref 135–145)
WBC # BLD: 8.4 K/CU MM (ref 4–10.5)

## 2020-09-30 PROCEDURE — 82962 GLUCOSE BLOOD TEST: CPT

## 2020-09-30 PROCEDURE — 93005 ELECTROCARDIOGRAM TRACING: CPT | Performed by: INTERNAL MEDICINE

## 2020-09-30 PROCEDURE — 99232 SBSQ HOSP IP/OBS MODERATE 35: CPT | Performed by: INTERNAL MEDICINE

## 2020-09-30 PROCEDURE — 94761 N-INVAS EAR/PLS OXIMETRY MLT: CPT

## 2020-09-30 PROCEDURE — APPSS30 APP SPLIT SHARED TIME 16-30 MINUTES: Performed by: NURSE PRACTITIONER

## 2020-09-30 PROCEDURE — 85730 THROMBOPLASTIN TIME PARTIAL: CPT

## 2020-09-30 PROCEDURE — 6370000000 HC RX 637 (ALT 250 FOR IP): Performed by: INTERNAL MEDICINE

## 2020-09-30 PROCEDURE — 93010 ELECTROCARDIOGRAM REPORT: CPT | Performed by: INTERNAL MEDICINE

## 2020-09-30 PROCEDURE — 36415 COLL VENOUS BLD VENIPUNCTURE: CPT

## 2020-09-30 PROCEDURE — 2580000003 HC RX 258: Performed by: INTERNAL MEDICINE

## 2020-09-30 PROCEDURE — 85027 COMPLETE CBC AUTOMATED: CPT

## 2020-09-30 PROCEDURE — 80048 BASIC METABOLIC PNL TOTAL CA: CPT

## 2020-09-30 RX ORDER — CARVEDILOL 6.25 MG/1
6.25 TABLET ORAL 2 TIMES DAILY
Qty: 60 TABLET | Refills: 0 | Status: SHIPPED | OUTPATIENT
Start: 2020-09-30 | End: 2021-06-25

## 2020-09-30 RX ORDER — NITROGLYCERIN 0.4 MG/1
TABLET SUBLINGUAL
Qty: 25 TABLET | Refills: 3 | Status: SHIPPED | OUTPATIENT
Start: 2020-09-30

## 2020-09-30 RX ORDER — POLYETHYLENE GLYCOL 3350 17 G/17G
17 POWDER, FOR SOLUTION ORAL DAILY PRN
Qty: 30 EACH | Refills: 0 | Status: SHIPPED | OUTPATIENT
Start: 2020-09-30 | End: 2020-10-30

## 2020-09-30 RX ORDER — ASPIRIN 81 MG/1
81 TABLET, CHEWABLE ORAL DAILY
Qty: 30 TABLET | Refills: 3 | Status: SHIPPED | OUTPATIENT
Start: 2020-09-30

## 2020-09-30 RX ADMIN — ALOGLIPTIN 25 MG: 25 TABLET, FILM COATED ORAL at 08:45

## 2020-09-30 RX ADMIN — CARVEDILOL 3.12 MG: 3.12 TABLET, FILM COATED ORAL at 08:47

## 2020-09-30 RX ADMIN — TICAGRELOR 90 MG: 90 TABLET ORAL at 08:48

## 2020-09-30 RX ADMIN — ATORVASTATIN CALCIUM 40 MG: 40 TABLET, FILM COATED ORAL at 08:48

## 2020-09-30 RX ADMIN — INSULIN LISPRO 2 UNITS: 100 INJECTION, SOLUTION INTRAVENOUS; SUBCUTANEOUS at 08:49

## 2020-09-30 RX ADMIN — OXYBUTYNIN CHLORIDE 5 MG: 5 TABLET ORAL at 08:48

## 2020-09-30 RX ADMIN — SODIUM CHLORIDE, PRESERVATIVE FREE 10 ML: 5 INJECTION INTRAVENOUS at 11:02

## 2020-09-30 RX ADMIN — SPIRONOLACTONE 25 MG: 25 TABLET ORAL at 08:48

## 2020-09-30 RX ADMIN — LISINOPRIL 40 MG: 40 TABLET ORAL at 08:47

## 2020-09-30 RX ADMIN — CLONIDINE HYDROCHLORIDE 0.3 MG: 0.1 TABLET ORAL at 08:45

## 2020-09-30 RX ADMIN — ASPIRIN 81 MG CHEWABLE TABLET 81 MG: 81 TABLET CHEWABLE at 08:48

## 2020-09-30 RX ADMIN — DULOXETINE HYDROCHLORIDE 60 MG: 30 CAPSULE, DELAYED RELEASE ORAL at 08:47

## 2020-09-30 ASSESSMENT — PAIN SCALES - GENERAL
PAINLEVEL_OUTOF10: 0

## 2020-09-30 NOTE — PLAN OF CARE
process, condition or treatment will be avoided or minimized  Description: Complications related to the disease process, condition or treatment will be avoided or minimized  9/29/2020 2257 by Keegan Albright RN  Outcome: Ongoing  9/29/2020 1944 by Hi Zacarias  Outcome: Ongoing  9/29/2020 0911 by Zoraida Meyers RN  Outcome: Ongoing  Goal: Risk factors for ineffective tissue perfusion will decrease  Description: Risk factors for ineffective tissue perfusion will decrease  9/29/2020 2257 by Keegan Albright RN  Outcome: Ongoing  9/29/2020 1944 by Hi Zacarias  Outcome: Ongoing  9/29/2020 0911 by Zoraida Meyers RN  Outcome: Ongoing  Goal: Ability to maintain vital signs within normal range will improve  Description: Ability to maintain vital signs within normal range will improve  9/29/2020 2257 by Keegan Albright RN  Outcome: Ongoing  9/29/2020 1944 by Hi Zacarias  Outcome: Ongoing  9/29/2020 0911 by Zoraida Meyers RN  Outcome: Ongoing  Goal: Cardiovascular alteration will improve  Description: Cardiovascular alteration will improve  9/29/2020 2257 by Keegan Albright RN  Outcome: Ongoing  9/29/2020 1944 by Hi Zacarias  Outcome: Ongoing  9/29/2020 0911 by Zoraida Meyers RN  Outcome: Ongoing     Problem: Fluid Volume:  Goal: Will show no signs or symptoms of fluid imbalance  Description: Will show no signs or symptoms of fluid imbalance  9/29/2020 2257 by Keegan Albright RN  Outcome: Ongoing  9/29/2020 1944 by Hi Zacarias  Outcome: Ongoing  9/29/2020 0911 by Zoraida Meyers RN  Outcome: Ongoing     Problem: Infection:  Goal: Will remain free from infection  Description: Will remain free from infection  9/29/2020 2257 by Keegan Albright RN  Outcome: Ongoing  9/29/2020 1944 by Hi Zacarias  Outcome: Ongoing  9/29/2020 0911 by Zoraida Meyers RN  Outcome: Ongoing     Problem: Safety:  Goal: Free from accidental physical injury  Description: Free from accidental physical injury  9/29/2020 2257 by Jericho Cabezas RN  Outcome: Ongoing  9/29/2020 1944 by Conception Josefina Zacarias  Outcome: Ongoing  9/29/2020 0911 by Kelly Schuster RN  Outcome: Ongoing  Goal: Free from intentional harm  Description: Free from intentional harm  9/29/2020 2257 by Jericho Cabezas RN  Outcome: Ongoing  9/29/2020 1944 by Conception Josefina Zacarias  Outcome: Ongoing  9/29/2020 0911 by Kelly Schuster RN  Outcome: Ongoing     Problem: Daily Care:  Goal: Daily care needs are met  Description: Daily care needs are met  9/29/2020 2257 by Jericho Cabezas RN  Outcome: Ongoing  9/29/2020 1944 by Conception Josefina Zacarias  Outcome: Ongoing  9/29/2020 0911 by Kelly Schuster RN  Outcome: Ongoing     Problem: Skin Integrity:  Goal: Skin integrity will stabilize  Description: Skin integrity will stabilize  9/29/2020 2257 by Jericho Cabezas RN  Outcome: Ongoing  9/29/2020 1944 by Conception Josefina Zacarias  Outcome: Ongoing  9/29/2020 0911 by Kelly Schuster RN  Outcome: Ongoing     Problem: Discharge Planning:  Goal: Patients continuum of care needs are met  Description: Patients continuum of care needs are met  9/29/2020 2257 by Jericho Cabezas RN  Outcome: Ongoing  9/29/2020 1944 by Conception Josefina Zacarias  Outcome: Ongoing  9/29/2020 0911 by Kelly Schuster RN  Outcome: Ongoing     Problem: Health Behavior:  Goal: Will modify at least one risk factor affecting health status  Description: Will modify at least one risk factor affecting health status  9/29/2020 2257 by Jericho Cabezas RN  Outcome: Ongoing  9/29/2020 1944 by Conception Josefina Zacarias  Outcome: Ongoing  9/29/2020 0911 by Kelly Schuster RN  Outcome: Ongoing  Goal: Identification of resources available to assist in meeting health care needs will improve  Description: Identification of resources available to assist in meeting health care needs will improve  9/29/2020 2257 by Jericho Cabezas RN  Outcome: Ongoing  9/29/2020 1944 by [address]  Outcome: Ongoing  9/29/2020 0911 by Brian Moseley RN  Outcome: Ongoing     Problem: Physical Regulation:  Goal: Complications related to the disease process, condition or treatment will be avoided or minimized  Description: Complications related to the disease process, condition or treatment will be avoided or minimized  9/29/2020 2257 by Calvin Gaston RN  Outcome: Ongoing  9/29/2020 1944 by Lamonte Zacarias  Outcome: Ongoing  9/29/2020 0911 by Brian Moseley RN  Outcome: Ongoing     Problem: Falls - Risk of:  Goal: Will remain free from falls  Description: Will remain free from falls  9/29/2020 2257 by Calvin Gaston RN  Outcome: Ongoing  9/29/2020 1944 by Lamonte Zacarias  Outcome: Ongoing  Goal: Absence of physical injury  Description: Absence of physical injury  9/29/2020 2257 by Calvin Gaston RN  Outcome: Ongoing  9/29/2020 1944 by [id number]  Outcome: Ongoing

## 2020-09-30 NOTE — PROGRESS NOTES
Nutrition Note    Referral for diet ed on diabetic diet. Patient asleep on visit. Left copy of carb controlled diet from [facility] care manual at bedside with contact information. Will continue to attempt ed during stay.     Electronically signed by Belem Will RD, LD on 9/30/20 at 11:34 AM EDT    Contact: [phone]

## 2020-09-30 NOTE — PLAN OF CARE
Problem: Pain:  Goal: Pain level will decrease  Description: Pain level will decrease  9/30/2020 1026 by Jenn Arana RN  Outcome: Ongoing  9/29/2020 2257 by Arianne Morgan RN  Outcome: Ongoing  Goal: Control of acute pain  Description: Control of acute pain  9/30/2020 1026 by Jenn Arana RN  Outcome: Ongoing  9/29/2020 2257 by Arianne Morgan RN  Outcome: Ongoing  Goal: Control of chronic pain  Description: Control of chronic pain  9/30/2020 1026 by Jenn Arana RN  Outcome: Ongoing  9/29/2020 2257 by Arianne Morgan RN  Outcome: Ongoing  Goal: Patient's pain/discomfort is manageable  Description: Patient's pain/discomfort is manageable  9/30/2020 1026 by Jenn Arana RN  Outcome: Ongoing  9/29/2020 2257 by Arianne Morgan RN  Outcome: Ongoing     Problem: Cardiac Output - Decreased:  Goal: Hemodynamic stability will improve  Description: Hemodynamic stability will improve  9/30/2020 1026 by Jenn Arana RN  Outcome: Ongoing  9/29/2020 2257 by Arianne Morgan RN  Outcome: Ongoing     Problem: Cardiac:  Goal: Hemodynamic stability will improve  Description: Hemodynamic stability will improve  9/30/2020 1026 by Jenn Arana RN  Outcome: Ongoing  9/29/2020 2257 by Arianne Morgan RN  Outcome: Ongoing  Goal: Ability to maintain an adequate cardiac output will improve  Description: Ability to maintain an adequate cardiac output will improve  9/30/2020 1026 by Jenn Arana RN  Outcome: Ongoing  9/29/2020 2257 by Arianne Morgan RN  Outcome: Ongoing  Goal: Complications related to the disease process, condition or treatment will be avoided or minimized  Description: Complications related to the disease process, condition or treatment will be avoided or minimized  9/30/2020 1026 by Jenn Arana RN  Outcome: Ongoing  9/29/2020 2257 by Arianne Morgan RN  Outcome: Ongoing  Goal: Risk factors for ineffective tissue perfusion will decrease  Description: Risk factors for ineffective tissue perfusion will decrease  9/30/2020 1026 by Fran Guillen RN  Outcome: Ongoing  9/29/2020 2257 by Michell Hernandez RN  Outcome: Ongoing  Goal: Ability to maintain vital signs within normal range will improve  Description: Ability to maintain vital signs within normal range will improve  9/30/2020 1026 by Fran Guillen RN  Outcome: Ongoing  9/29/2020 2257 by Michell Hernandez RN  Outcome: Ongoing  Goal: Cardiovascular alteration will improve  Description: Cardiovascular alteration will improve  9/30/2020 1026 by Fran Guillen RN  Outcome: Ongoing  9/29/2020 2257 by Michell Hernandez RN  Outcome: Ongoing     Problem: Fluid Volume:  Goal: Will show no signs or symptoms of fluid imbalance  Description: Will show no signs or symptoms of fluid imbalance  9/30/2020 1026 by Fran Guillen RN  Outcome: Ongoing  9/29/2020 2257 by Michell Hernandez RN  Outcome: Ongoing     Problem: Infection:  Goal: Will remain free from infection  Description: Will remain free from infection  9/30/2020 1026 by Fran Guillen RN  Outcome: Ongoing  9/29/2020 2257 by Michell Hernandez RN  Outcome: Ongoing     Problem: Safety:  Goal: Free from accidental physical injury  Description: Free from accidental physical injury  9/30/2020 1026 by Fran Guillen RN  Outcome: Ongoing  9/29/2020 2257 by Michell Hernandez RN  Outcome: Ongoing  Goal: Free from intentional harm  Description: Free from intentional harm  9/30/2020 1026 by Fran Guillen RN  Outcome: Ongoing  9/29/2020 2257 by Michell Hernandez RN  Outcome: Ongoing     Problem: Daily Care:  Goal: Daily care needs are met  Description: Daily care needs are met  9/30/2020 1026 by Fran Guillen RN  Outcome: Ongoing  9/29/2020 2257 by Michell Hernandez RN  Outcome: Ongoing     Problem: Skin Integrity:  Goal: Skin integrity will stabilize  Description: Skin integrity will stabilize  9/30/2020 1026 by Fran Guillen RN  Outcome: Ongoing  9/29/2020 2257 by Jewels Plaza RN  Outcome: Ongoing     Problem: Discharge Planning:  Goal: Patients continuum of care needs are met  Description: Patients continuum of care needs are met  9/30/2020 1026 by Paulina Pina RN  Outcome: Ongoing  9/29/2020 2257 by Jewels Plaza RN  Outcome: Ongoing     Problem: Health Behavior:  Goal: Will modify at least one risk factor affecting health status  Description: Will modify at least one risk factor affecting health status  9/30/2020 1026 by Paulina Pina RN  Outcome: Ongoing  9/29/2020 2257 by Jewels Plaza RN  Outcome: Ongoing  Goal: Identification of resources available to assist in meeting health care needs will improve  Description: Identification of resources available to assist in meeting health care needs will improve  9/30/2020 1026 by Paulina Pina RN  Outcome: Ongoing  9/29/2020 2257 by Jewels Plaza RN  Outcome: Ongoing     Problem: Physical Regulation:  Goal: Complications related to the disease process, condition or treatment will be avoided or minimized  Description: Complications related to the disease process, condition or treatment will be avoided or minimized  9/30/2020 1026 by Paulina Pina RN  Outcome: Ongoing  9/29/2020 2257 by Jewels Plaza RN  Outcome: Ongoing     Problem: Falls - Risk of:  Goal: Will remain free from falls  Description: Will remain free from falls  9/30/2020 1026 by Paulina Pina RN  Outcome: Ongoing  9/29/2020 2257 by Jewels Plaza RN  Outcome: Ongoing  Goal: Absence of physical injury  Description: Absence of physical injury  9/30/2020 1026 by Paulina Pina RN  Outcome: Ongoing  9/29/2020 2257 by Jewels Plaza RN  Outcome: Ongoing

## 2020-09-30 NOTE — PROGRESS NOTES
Cardiology Note    Admit Date:  9/28/2020    Admission diagnosis / Complaint: chest pain      Subjective:  Mr. Susanna Salazar is resting in bed. Denies cardiac complaints. Wants to go home. Assessment and Plan:    1. Chest Pain: troponins and EKG reviewed. [facility] yesterday. Successful TRUDI to Proximal and distral RCA and proximal and distal LAD. Continue brilinta and ASA. 2. HTN: stable. Continue current medical management. Continue lisinopril, catapres, aldactone, coreg    3. HLD- continue statin    4. DM- on meds per primary team    Patient is stable for discharge with follow up in the office in 2 weeks. 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 has a past surgical history that includes back surgery; TURP; Pilonidal cyst excision; Colonoscopy; TURP (2005); Pilonidal cyst excision (1975); and laminectomy (5/20/12). Physical Exam:  General:  Awake, alert, NAD  Skin:  Warm and dry. Right femoral groin site soft, minimal tender. Minimal bruising.  No hematoma  Neck:  JVD not appreciated  Chest:  Clear to auscultation, respiration easy  Cardiovascular:  RRR S1S2  Abdomen:  Soft nontender  Extremities:  No edema    Medications:    lisinopril  40 mg Oral Daily    sodium chloride flush  10 mL Intravenous 2 times per day    spironolactone  25 mg Oral Daily    alogliptin  25 mg Oral Daily    cloNIDine  0.3 mg Oral BID    sodium chloride flush  10 mL Intravenous 2 times per day    carvedilol  3.125 mg Oral BID WC    ticagrelor  90 mg Oral BID    aspirin  81 mg Oral Daily    atorvastatin  40 mg Oral Daily    DULoxetine  60 mg Oral Daily    insulin glargine  20 Units Subcutaneous Nightly    oxybutynin  5 mg Oral TID    insulin lispro  0-12 Units Subcutaneous TID WC    insulin lispro  0-6 Units Subcutaneous Nightly      sodium chloride      dextrose       sodium chloride flush, sodium chloride flush, acetaminophen, melatonin, polyethylene glycol, promethazine **OR** ondansetron, potassium chloride **OR** potassium alternative oral replacement **OR** potassium chloride, magnesium sulfate, nitroGLYCERIN, glucose, dextrose, glucagon (rDNA), dextrose    Lab Data:  CBC:   Recent Labs     09/29/20  0003 09/29/20  0610 09/30/20  0636   WBC 8.0 7.8 8.4   HGB 16.0 15.8 15.7   HCT 48.1 47.3 46.3   MCV 94.1 92.7 92.0    206 220     BMP:   Recent Labs     09/28/20  1320 09/30/20  0636    136   K 4.1 4.1   CL 96* 98*   CO2 29 27   BUN 32* 24*   CREATININE 1.1 1.2     LIVER PROFILE: No results for input(s): AST, ALT, LIPASE, BILIDIR, BILITOT, ALKPHOS in the last 72 hours. Invalid input(s): AMYLASE,  ALB  PT/INR: No results for input(s): PROTIME, INR in the last 72 hours. APTT:   Recent Labs     09/29/20  1813 09/30/20  0008 09/30/20  0636   APTT 58.3* 35.0 30.0     BNP:    TROPONIN:           CARLIN Johnson 9/30/2020 12:30 PM   npaadd  I have seen ,spoken to  and examined this patient personally, independently of the nurse practitioner. I have reviewed the hospital care given to date and reviewed all pertinent labs and imaging. The plan was developed mutually at the time of the visit with the patient,  NP  and myself. I have spoken with patient, nursing staff and provided written and verbal instructions . The above note has been reviewed and I agree with the assessment, diagnosis, and treatment plan

## 2020-10-01 ENCOUNTER — TELEPHONE (OUTPATIENT)
Dept: CARDIOLOGY CLINIC | Age: 70
End: 2020-10-01

## 2020-10-07 ENCOUNTER — OFFICE VISIT (OUTPATIENT)
Dept: CARDIOLOGY CLINIC | Age: 70
End: 2020-10-07
Payer: MEDICARE

## 2020-10-07 VITALS
HEART RATE: 72 BPM | HEIGHT: 71 IN | DIASTOLIC BLOOD PRESSURE: 70 MMHG | WEIGHT: 261.2 LBS | BODY MASS INDEX: 36.57 KG/M2 | SYSTOLIC BLOOD PRESSURE: 120 MMHG

## 2020-10-07 PROCEDURE — 1123F ACP DISCUSS/DSCN MKR DOCD: CPT | Performed by: INTERNAL MEDICINE

## 2020-10-07 PROCEDURE — 1111F DSCHRG MED/CURRENT MED MERGE: CPT | Performed by: INTERNAL MEDICINE

## 2020-10-07 PROCEDURE — 93000 ELECTROCARDIOGRAM COMPLETE: CPT | Performed by: INTERNAL MEDICINE

## 2020-10-07 PROCEDURE — G8417 CALC BMI ABV UP PARAM F/U: HCPCS | Performed by: INTERNAL MEDICINE

## 2020-10-07 PROCEDURE — 1036F TOBACCO NON-USER: CPT | Performed by: INTERNAL MEDICINE

## 2020-10-07 PROCEDURE — 4040F PNEUMOC VAC/ADMIN/RCVD: CPT | Performed by: INTERNAL MEDICINE

## 2020-10-07 PROCEDURE — G8427 DOCREV CUR MEDS BY ELIG CLIN: HCPCS | Performed by: INTERNAL MEDICINE

## 2020-10-07 PROCEDURE — 99214 OFFICE O/P EST MOD 30 MIN: CPT | Performed by: INTERNAL MEDICINE

## 2020-10-07 PROCEDURE — G8484 FLU IMMUNIZE NO ADMIN: HCPCS | Performed by: INTERNAL MEDICINE

## 2020-10-07 PROCEDURE — 3017F COLORECTAL CA SCREEN DOC REV: CPT | Performed by: INTERNAL MEDICINE

## 2020-10-07 RX ORDER — CLONIDINE HYDROCHLORIDE 0.2 MG/1
1 TABLET ORAL 2 TIMES DAILY
COMMUNITY
Start: 2020-03-11

## 2020-10-07 NOTE — PATIENT INSTRUCTIONS
Please hold on to these instructions the  will call you within 1-9 business days when we receive authorization from your insurance. Treadmill Stress test    WHAT TO EXPECT:     The exercise stress test is a test used to provide information about how the heart responds to exertion. It involves walking on a treadmill at increasing levels of difficulty, while electrocardiogram, heart rate, and blood pressure are monitored. ? This test will take approximately 1 hours: Please arrive at the office 5-10 min before the scheduled testing time. ? Once you are taken back to the stress lab you will be asked to read and sign a consent before proceeding with the test. At this time feel free to ask any question that you may have as the procedure is explained to you.   ? You will be attached to the EKG monitoring equipment, your blood pressure will be taken, and you will begin walking on the treadmill. The treadmill starts off slowly and every 3 minutes the treadmill speeds up and the elevation increases. The average person usually walks for a period of 6-8min. PREPARATION FOR TEST:    ? Eat a light meal such as juice and toast at least 2 hours prior to the procedure. ? AVOID CAFFEINE 24 HOURS PRIOR TO THE TEST: Including coffee, Tea, Gurvinder and other soft drinks even those labeled  caffeine free or decaffeinated. ? Please wear loose comfortable clothing and comfortable walking shoes. Please wear a short sleeved shirt. Please shower or bath and do not apply powder or lotion to the skin prior to testing, as the electrodes will adhere better giving us a clearer visual EKG recording.

## 2020-10-07 NOTE — PROGRESS NOTES
CARDIOLOGY NOTE      10/7/2020    RE: Bobby Gennaro  ([birth year])                               TO:  Dr. Woodard primary care provider on file. Charlee Gleasno is a 69 y.o. male who was seen today for management of  cad                       Here post HV for PCI             HPI:                   The patient does not have cardiac complaints  Patient also seen  for    - Coronary artery disease, does not have chest pain. Patient is  compliant with prescribed medicines. - Hypertension,is  well controlled, pt is  compliant with medicines  - Hyperlipidimea, importance of hyperlipidimea discussed with pt.   - Diabetes mellitus, blood glucose level is  well controlled.  Pt is compliant with meds and diet      Bobby Burdick has the following history recorded in care path:  Patient Active Problem List    Diagnosis Date Noted    Obesity 01/22/2016     Priority: Medium    Incontinent of urine 01/22/2016     Priority: Low    Erectile dysfunction 01/22/2016     Priority: Low    NSTEMI (non-ST elevated myocardial infarction) ([facility] [address] 09/28/2020    H/O colonoscopy 09/28/2016    Essential hypertension 09/27/2016    Mixed hyperlipidemia 09/27/2016    Spinal stenosis 08/08/2016    Testosterone deficiency 08/08/2016    Neuropathy 01/22/2016    Skin fissures 01/22/2016    Spinal stenosis 05/20/2013     Current Outpatient Medications   Medication Sig Dispense Refill    cloNIDine (CATAPRES) 0.2 MG tablet 1 tablet 2 times daily      carvedilol (COREG) 6.25 MG tablet Take 1 tablet by mouth 2 times daily 60 tablet 0    ticagrelor (BRILINTA) 90 MG TABS tablet Take 1 tablet by mouth 2 times daily 60 tablet 3    aspirin 81 MG chewable tablet Take 1 tablet by mouth daily 30 tablet 3    insulin glargine (LANTUS) 100 UNIT/ML injection vial Inject 20 Units into the skin nightly      DULoxetine (CYMBALTA) 30 MG extended release capsule Take 60 mg by mouth daily       alogliptin (NESINA) 25 MG TABS tablet Take 25 mg by mouth daily      benazepril (LOTENSIN) 40 MG tablet Take 1 tablet by mouth daily 90 tablet 1    oxybutynin (DITROPAN) 5 MG tablet Take 1 tablet by mouth 3 times daily 270 tablet 1    atorvastatin (LIPITOR) 20 MG tablet Take 1 tablet by mouth daily 90 tablet 1    chlorthalidone (HYGROTON) 25 MG tablet Take 25 mg by mouth daily      spironolactone (ALDACTONE) 25 MG tablet Take 1 tablet by mouth daily 90 tablet 3    nitroGLYCERIN (NITROSTAT) 0.4 MG SL tablet up to max of 3 total doses. If no relief after 1 dose, call 911. (Patient not taking: Reported on 10/7/2020) 25 tablet 3    polyethylene glycol (GLYCOLAX) 17 g packet Take 17 g by mouth daily as needed for Constipation (Patient not taking: Reported on 10/7/2020) 30 each 0    Blood Glucose Monitoring Suppl (ACURA BLOOD GLUCOSE METER) W/DEVICE KIT 1 kit by Does not apply route once for 1 dose 1 kit 0     No current facility-administered medications for this visit. 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, with long-term current use of insulin ([facility] [address] 9/27/2016    Erectile dysfunction     Hearing impairment     pt wears hearing aids    Hyperlipidemia     Hypertension     Incontinent of urine     Neuropathy 1/22/2016    Obesity     Uses hearing aid     right ear     Past Surgical History:   Procedure Laterality Date    BACK SURGERY      COLONOSCOPY      LAMINECTOMY  5/20/12    L3-4 L4-5 decom ryan    PILONIDAL CYST EXCISION      PILONIDAL CYST EXCISION  1975    TURP      TURP  2005      As reviewed   Family History   Problem Relation Age of Onset    Heart Disease Mother     Diabetes Mother     Cancer Father         renal    Heart Disease Brother         had heart transplant     Cancer Other     Other Mother 47        aortic aneurysm     High Blood Pressure Mother     Heart Failure Brother 52    Cancer Brother     Heart Attack Sister     COPD Sister     Cancer Brother         colon    Other Paternal Grandmother         hemorrhaged to death    Diabetes Maternal Aunt     Diabetes Maternal Aunt     Heart Disease Maternal Aunt     Diabetes Other      Social History     Tobacco Use    Smoking status: Former Smoker     Packs/day: 2.50     Years: 20.00     Pack years: 50.00     Last attempt to quit: 5/10/1989     Years since quittin.4    Smokeless tobacco: Never Used   Substance Use Topics    Alcohol use: Yes     Comment: rare      Review of Systems:    Constitutional: Negative for diaphoresis and fatigue  Psychological:Negative for anxiety or depression  HENT: Negative for headaches, nasal congestion, sinus pain or vertigo  Eyes: Negative for visual disturbance. Endocrine: Negative for polydipsia/polyuria  Respiratory: Negative for shortness of breath  Cardiovascular: Negative for chest pain, dyspnea on exertion, claudication, edema, irregular heartbeat, murmur, palpitations or shortness of breath  Gastrointestinal: Negative for abdominal pain or heartburn  Genito-Urinary: Negative for urinary frequency/urgency  Musculoskeletal: Negative for muscle pain, muscular weakness, negative for pain in arm and leg or swelling in foot and leg  Neurological: Negative for dizziness, headaches, memory loss, numbness/tingling, visual changes, syncope  Dermatological: Negative for rash    Objective:    Vitals:    10/07/20 1122   BP: 120/70   Site: Left Upper Arm   Position: Sitting   Cuff Size: Large Adult   Pulse: 72   Weight: 261 lb 3.2 oz (118.5 kg)   Height: 5' 11\" (1.803 m)     /70 (Site: Left Upper Arm, Position: Sitting, Cuff Size: Large Adult)   Pulse 72   Ht 5' 11\" (1.803 m)   Wt 261 lb 3.2 oz (118.5 kg)   BMI 36.43 kg/m²     No flowsheet data found.      Wt Readings from Last 3 Encounters:   10/07/20 261 lb 3.2 oz (118.5 kg)   09/30/20 259 lb 14.4 oz (117.9 kg)   09/28/20 267 lb (121.1 kg)     Body mass index is 36.43 kg/m². GENERAL - Alert, oriented, pleasant, in no apparent distress. EYES: No jaundice, no conjunctival pallor. SKIN: It is warm & dry. No rashes. No Echhymosis    HEENT - No clinically significant abnormalities seen. Neck - Supple. No jugular venous distention noted. No carotid bruits. Cardiovascular - Normal S1 and S2 without obvious murmur or gallop. Extremities - No cyanosis, clubbing, or significant edema. Pulmonary - No respiratory distress. No wheezes or rales. Abdomen - No masses, tenderness, or organomegaly. Musculoskeletal - No significant edema. No joint deformities. No muscle wasting. Neurologic - Cranial nerves II through XII are grossly intact. There were no gross focal neurologic abnormalities.     Lab Review   Lab Results   Component Value Date    REJTOIS 564 07/13/2013    TROPONINT 0.020 09/29/2020     BNP:  No results found for: BNP  PT/INR:    Lab Results   Component Value Date    INR 0.99 05/10/2013     Lab Results   Component Value Date    LABA1C 7.9 (H) 09/29/2020    LABA1C 7.7 (H) 12/30/2016     Lab Results   Component Value Date    WBC 8.4 09/30/2020    HCT 46.3 09/30/2020    MCV 92.0 09/30/2020     09/30/2020     Lab Results   Component Value Date    CHOL 177 09/29/2020    TRIG 382 (H) 09/29/2020    HDL 32 (L) 09/29/2020    LDLCALC 121 (H) 04/06/2013    LDLDIRECT 99 09/29/2020     Lab Results   Component Value Date    ALT 27 07/11/2019    AST 23 07/11/2019     BMP:    Lab Results   Component Value Date     09/30/2020    K 4.1 09/30/2020    CL 98 09/30/2020    CO2 27 09/30/2020    BUN 24 09/30/2020    CREATININE 1.2 09/30/2020     CMP:   Lab Results   Component Value Date     09/30/2020    K 4.1 09/30/2020    CL 98 09/30/2020    CO2 27 09/30/2020    BUN 24 09/30/2020    PROT 8.3 07/11/2019    PROT 7.3 01/09/2013

## 2020-10-07 NOTE — LETTER
Jolene Wilson  1950  [id number]    Have you had any Chest Pain - Yes, ONGOING, OCCASIONAL  If Yes DO EKG - How does it feel - Dull Ache   How long does the pain last - minutes   How long have you been having the pain - Months   Did you take a NONE   And did it relieve the pain - IT GOES AWAY ON IT'S OWN    Have you had any Shortness of Breath - Yes  If Yes - When at rest and on exertion    Have you had any dizziness - No     Have you had any palpitations - No       Is the patient on any of the following medications - NONE  If Yes DO EKG    Do you have any edema - swelling in NONE, BESIDES NEUROPATHY    If Yes - CHECK TO SEE IF THE EDEMA IS PITTING      Do you have a surgery or procedure scheduled in the near future - Yes  If Yes- DO EKG  If Yes - Who is the surgery with?  [id number]  Phone number of physician   Fax number of physician   Type of surgery  POSSIBLE COLONOSCOPY      BE SURE TO GIVE THIS INFORMATION to [facility]    Ask patient if they want to [facility] if they are not already signed up    Check to see if we have an E-MAIL on file for the patient    Check medication list thoroughly!!!  BE SURE TO ASK PATIENT IF THEY NEED MEDICATION REFILLS

## 2020-10-14 ENCOUNTER — PROCEDURE VISIT (OUTPATIENT)
Dept: CARDIOLOGY CLINIC | Age: 70
End: 2020-10-14
Payer: MEDICARE

## 2020-10-14 VITALS
HEIGHT: 71 IN | BODY MASS INDEX: 36.54 KG/M2 | WEIGHT: 261 LBS | HEART RATE: 58 BPM | DIASTOLIC BLOOD PRESSURE: 60 MMHG | SYSTOLIC BLOOD PRESSURE: 96 MMHG

## 2020-10-14 PROBLEM — Z98.61 POST PTCA: Status: ACTIVE | Noted: 2020-09-29

## 2020-10-14 PROCEDURE — 93015 CV STRESS TEST SUPVJ I&R: CPT | Performed by: INTERNAL MEDICINE

## 2020-10-29 ENCOUNTER — TELEPHONE (OUTPATIENT)
Dept: FAMILY MEDICINE CLINIC | Age: 70
End: 2020-10-29

## 2020-11-02 ENCOUNTER — HOSPITAL ENCOUNTER (OUTPATIENT)
Dept: CARDIAC REHAB | Age: 70
Setting detail: THERAPIES SERIES
Discharge: HOME | End: 2020-11-02
Payer: MEDICARE

## 2020-11-06 ENCOUNTER — HOSPITAL ENCOUNTER (OUTPATIENT)
Dept: CARDIAC REHAB | Age: 70
Setting detail: THERAPIES SERIES
Discharge: HOME | End: 2020-11-06
Payer: MEDICARE

## 2020-11-06 LAB — GLUCOSE BLD-MCNC: 160 MG/DL (ref 70–99)

## 2020-11-06 PROCEDURE — 82962 GLUCOSE BLOOD TEST: CPT

## 2020-11-06 PROCEDURE — 93798 PHYS/QHP OP CAR RHAB W/ECG: CPT

## 2020-11-09 ENCOUNTER — HOSPITAL ENCOUNTER (OUTPATIENT)
Dept: CARDIAC REHAB | Age: 70
Setting detail: THERAPIES SERIES
Discharge: HOME | End: 2020-11-09
Payer: MEDICARE

## 2020-11-09 LAB — GLUCOSE BLD-MCNC: 188 MG/DL (ref 70–99)

## 2020-11-09 PROCEDURE — 93798 PHYS/QHP OP CAR RHAB W/ECG: CPT

## 2020-11-09 PROCEDURE — 82962 GLUCOSE BLOOD TEST: CPT

## 2020-11-10 ENCOUNTER — OFFICE VISIT (OUTPATIENT)
Dept: CARDIOLOGY CLINIC | Age: 70
End: 2020-11-10
Payer: MEDICARE

## 2020-11-10 VITALS
BODY MASS INDEX: 37.1 KG/M2 | WEIGHT: 265 LBS | HEIGHT: 71 IN | SYSTOLIC BLOOD PRESSURE: 118 MMHG | HEART RATE: 66 BPM | DIASTOLIC BLOOD PRESSURE: 70 MMHG

## 2020-11-10 PROCEDURE — 4040F PNEUMOC VAC/ADMIN/RCVD: CPT | Performed by: INTERNAL MEDICINE

## 2020-11-10 PROCEDURE — G8427 DOCREV CUR MEDS BY ELIG CLIN: HCPCS | Performed by: INTERNAL MEDICINE

## 2020-11-10 PROCEDURE — 3017F COLORECTAL CA SCREEN DOC REV: CPT | Performed by: INTERNAL MEDICINE

## 2020-11-10 PROCEDURE — 1036F TOBACCO NON-USER: CPT | Performed by: INTERNAL MEDICINE

## 2020-11-10 PROCEDURE — G8417 CALC BMI ABV UP PARAM F/U: HCPCS | Performed by: INTERNAL MEDICINE

## 2020-11-10 PROCEDURE — 99214 OFFICE O/P EST MOD 30 MIN: CPT | Performed by: INTERNAL MEDICINE

## 2020-11-10 PROCEDURE — G8484 FLU IMMUNIZE NO ADMIN: HCPCS | Performed by: INTERNAL MEDICINE

## 2020-11-10 PROCEDURE — 1123F ACP DISCUSS/DSCN MKR DOCD: CPT | Performed by: INTERNAL MEDICINE

## 2020-11-10 RX ORDER — GLIPIZIDE 5 MG/1
10 TABLET ORAL 2 TIMES DAILY
COMMUNITY
Start: 2020-09-01

## 2020-11-10 NOTE — LETTER
Lauren Hinkle  1950  [id number]    Have you had any Chest Pain - No    Have you had any Shortness of Breath - Yes  If Yes - When at rest and on exertion    Have you had any dizziness - No      Have you had any palpitations - No         Do you have any edema - No    Do you have a surgery or procedure scheduled in the near future - No

## 2020-11-10 NOTE — PROGRESS NOTES
CARDIOLOGY NOTE      11/10/2020    RE: Argelia Spence  ([birth year])                               TO:  Dr. Woodard primary care provider on file. My Majano is a 69 y.o. male who was seen today for management of  cad                                   HPI:                   The patient does not have cardiac complaints  Patient also seen  for    - Coronary artery disease, does not have chest pain. Patient is  compliant with prescribed medicines. - Hypertension,is  well controlled, pt is  compliant with medicines  - Hyperlipidimea, importance of hyperlipidimea discussed with pt.   - Diabetes mellitus, blood glucose level is  well controlled. Pt is compliant with meds and diet      Argelia Spence has the following history recorded in care path:  Patient Active Problem List    Diagnosis Date Noted    Obesity 01/22/2016     Priority: Medium    Incontinent of urine 01/22/2016     Priority: Low    Erectile dysfunction 01/22/2016     Priority: Low    FHx: coronary artery disease     Post PTCA 09/29/2020    NSTEMI (non-ST elevated myocardial infarction) ([facility] [address] 09/28/2020    H/O colonoscopy 09/28/2016    Essential hypertension 09/27/2016    Mixed hyperlipidemia 09/27/2016    Spinal stenosis 08/08/2016    Testosterone deficiency 08/08/2016    Neuropathy 01/22/2016    Skin fissures 01/22/2016    Spinal stenosis 05/20/2013     Current Outpatient Medications   Medication Sig Dispense Refill    glipiZIDE (GLUCOTROL) 5 MG tablet Take 5 mg by mouth 2 times daily      metFORMIN (GLUCOPHAGE) 500 MG tablet Take 500 mg by mouth 2 times daily (with meals)      cloNIDine (CATAPRES) 0.2 MG tablet 1 tablet 2 times daily      carvedilol (COREG) 6.25 MG tablet Take 1 tablet by mouth 2 times daily 60 tablet 0    nitroGLYCERIN (NITROSTAT) 0.4 MG SL tablet up to max of 3 total doses.  If no relief after 1 dose, call 911. 25 tablet 3    ticagrelor (BRILINTA) 90 MG TABS tablet Take 1 tablet by mouth 2 times daily 60 tablet 3    aspirin 81 MG chewable tablet Take 1 tablet by mouth daily 30 tablet 3    insulin glargine (LANTUS) 100 UNIT/ML injection vial Inject 20 Units into the skin nightly      DULoxetine (CYMBALTA) 30 MG extended release capsule Take 60 mg by mouth daily       alogliptin (NESINA) 25 MG TABS tablet Take 25 mg by mouth daily      benazepril (LOTENSIN) 40 MG tablet Take 1 tablet by mouth daily 90 tablet 1    oxybutynin (DITROPAN) 5 MG tablet Take 1 tablet by mouth 3 times daily 270 tablet 1    atorvastatin (LIPITOR) 20 MG tablet Take 1 tablet by mouth daily 90 tablet 1    chlorthalidone (HYGROTON) 25 MG tablet Take 25 mg by mouth daily      spironolactone (ALDACTONE) 25 MG tablet Take 1 tablet by mouth daily 90 tablet 3    Blood Glucose Monitoring Suppl (ACURA BLOOD GLUCOSE METER) W/DEVICE KIT 1 kit by Does not apply route once for 1 dose 1 kit 0     No current facility-administered medications for this visit. 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     Uses hearing aid     right ear     Past Surgical History:   Procedure Laterality Date    BACK SURGERY      COLONOSCOPY      LAMINECTOMY  5/20/12    L3-4 L4-5 decom ryan    PILONIDAL CYST EXCISION      PILONIDAL CYST EXCISION  1975    TURP      TURP  2005      As reviewed   Family History   Problem Relation Age of Onset    Heart Disease Mother     Diabetes Mother     Cancer Father         renal    Heart Disease Brother         had heart transplant     Cancer Other     Other Mother 47        aortic aneurysm     High Blood Pressure Mother     Heart Failure Brother 52    Cancer Brother     Heart Attack Sister     COPD Sister     Cancer Brother         colon    Other Paternal Grandmother         hemorrhaged to death    Diabetes Maternal Aunt     Diabetes Maternal Aunt     Heart Disease Maternal Aunt     Diabetes Other      Social History     Tobacco Use    Smoking status: Former Smoker     Packs/day: 2.50     Years: 20.00     Pack years: 50.00     Last attempt to quit: 5/10/1989     Years since quittin.5    Smokeless tobacco: Never Used   Substance Use Topics    Alcohol use: Yes     Comment: rare      Review of Systems:    Constitutional: Negative for diaphoresis and fatigue  Psychological:Negative for anxiety or depression  HENT: Negative for headaches, nasal congestion, sinus pain or vertigo  Eyes: Negative for visual disturbance. Endocrine: Negative for polydipsia/polyuria  Respiratory: Negative for shortness of breath  Cardiovascular: Negative for chest pain, dyspnea on exertion, claudication, edema, irregular heartbeat, murmur, palpitations or shortness of breath  Gastrointestinal: Negative for abdominal pain or heartburn  Genito-Urinary: Negative for urinary frequency/urgency  Musculoskeletal: Negative for muscle pain, muscular weakness, negative for pain in arm and leg or swelling in foot and leg  Neurological: Negative for dizziness, headaches, memory loss, numbness/tingling, visual changes, syncope  Dermatological: Negative for rash    Objective:    Vitals:    11/10/20 1537   BP: 118/70   Pulse: 66   Weight: 265 lb (120.2 kg)   Height: 5' 11\" (1.803 m)     /70   Pulse 66   Ht 5' 11\" (1.803 m)   Wt 265 lb (120.2 kg)   BMI 36.96 kg/m²     No flowsheet data found.      Wt Readings from Last 3 Encounters:   11/10/20 265 lb (120.2 kg)   10/14/20 261 lb (118.4 kg)   10/07/20 261 lb 3.2 oz (118.5 kg)     Body mass index is 36.96 kg/m². GENERAL - Alert, oriented, pleasant, in no apparent distress. EYES: No jaundice, no conjunctival pallor. SKIN: It is warm & dry. No rashes. No Echhymosis    HEENT - No clinically significant abnormalities seen. Neck - Supple. No jugular venous distention noted. No carotid bruits. Cardiovascular - Normal S1 and S2 without obvious murmur or gallop. Extremities - No cyanosis, clubbing, or significant edema. Pulmonary - No respiratory distress. No wheezes or rales. Abdomen - No masses, tenderness, or organomegaly. Musculoskeletal - No significant edema. No joint deformities. No muscle wasting. Neurologic - Cranial nerves II through XII are grossly intact. There were no gross focal neurologic abnormalities.     Lab Review   Lab Results   Component Value Date    ISFUZAX 221 07/13/2013    TROPONINT 0.020 09/29/2020     BNP:  No results found for: BNP  PT/INR:    Lab Results   Component Value Date    INR 0.99 05/10/2013     Lab Results   Component Value Date    LABA1C 7.9 (H) 09/29/2020    LABA1C 7.7 (H) 12/30/2016     Lab Results   Component Value Date    WBC 8.4 09/30/2020    HCT 46.3 09/30/2020    MCV 92.0 09/30/2020     09/30/2020     Lab Results   Component Value Date    CHOL 177 09/29/2020    TRIG 382 (H) 09/29/2020    HDL 32 (L) 09/29/2020    LDLCALC 121 (H) 04/06/2013    LDLDIRECT 99 09/29/2020     Lab Results   Component Value Date    ALT 27 07/11/2019    AST 23 07/11/2019     BMP:    Lab Results   Component Value Date     09/30/2020    K 4.1 09/30/2020    CL 98 09/30/2020    CO2 27 09/30/2020    BUN 24 09/30/2020    CREATININE 1.2 09/30/2020     CMP:   Lab Results   Component Value Date     09/30/2020    K 4.1 09/30/2020    CL 98 09/30/2020    CO2 27 09/30/2020    BUN 24 09/30/2020    PROT 8.3 07/11/2019    PROT 7.3 01/09/2013     TSH:    Lab Results   Component Value Date    TSH 2.670 09/13/2016         Impression:    No diagnosis found. Patient Active Problem List   Diagnosis Code    Obesity E66.9    Incontinent of urine R32    Erectile dysfunction N52.9    Neuropathy G62.9    Skin fissures R23.4    Spinal stenosis M48.00    Testosterone deficiency E34.9    Spinal stenosis M48.00    Essential hypertension I10    Mixed hyperlipidemia E78.2    H/O colonoscopy Z98.890    NSTEMI (non-ST elevated myocardial infarction) ([facility] [address] I21.4    Post PTCA Z98.61    FHx: coronary artery disease Z82.49       Assessment & Plan:               -     CORONARY ARTERY DISEASE:  asymptomatic     All available  tests in chart reviewed. Management discussed . Testing ordered  no        In  rehab                         -  Hypertension: Patients blood pressure is normal. Patient is advised about low sodium diet. Present medical regimen will not be changed. -  LIPID MANAGEMENT:  Importance of lipid levels discussed with patient   and patient was given dietary advice. NCEP- ATP III guidelines reviewed with patient. -   Changes  in medicines made: No                         -   DIABETES MELLITUS: Available pertinent lab data reviewed   and  patient was given dietary advice . Advised to check blood glucose level on a regular basis.       -   Changes  in medicines made:  no                               Ene Ogden MD    [facility]

## 2020-11-11 ENCOUNTER — HOSPITAL ENCOUNTER (OUTPATIENT)
Dept: CARDIAC REHAB | Age: 70
Setting detail: THERAPIES SERIES
Discharge: HOME | End: 2020-11-11
Payer: MEDICARE

## 2020-11-11 LAB — GLUCOSE BLD-MCNC: 227 MG/DL (ref 70–99)

## 2020-11-11 PROCEDURE — 93798 PHYS/QHP OP CAR RHAB W/ECG: CPT

## 2020-11-11 PROCEDURE — 82962 GLUCOSE BLOOD TEST: CPT

## 2020-11-13 ENCOUNTER — HOSPITAL ENCOUNTER (OUTPATIENT)
Dept: CARDIAC REHAB | Age: 70
Setting detail: THERAPIES SERIES
Discharge: HOME | End: 2020-11-13
Payer: MEDICARE

## 2020-11-13 LAB — GLUCOSE BLD-MCNC: 154 MG/DL (ref 70–99)

## 2020-11-13 PROCEDURE — 82962 GLUCOSE BLOOD TEST: CPT

## 2020-11-13 PROCEDURE — 93798 PHYS/QHP OP CAR RHAB W/ECG: CPT

## 2020-11-16 ENCOUNTER — HOSPITAL ENCOUNTER (OUTPATIENT)
Dept: CARDIAC REHAB | Age: 70
Setting detail: THERAPIES SERIES
Discharge: HOME | End: 2020-11-16
Payer: MEDICARE

## 2020-11-16 LAB — GLUCOSE BLD-MCNC: 235 MG/DL (ref 70–99)

## 2020-11-16 PROCEDURE — 82962 GLUCOSE BLOOD TEST: CPT

## 2020-11-16 PROCEDURE — 93798 PHYS/QHP OP CAR RHAB W/ECG: CPT

## 2020-11-18 ENCOUNTER — HOSPITAL ENCOUNTER (OUTPATIENT)
Dept: CARDIAC REHAB | Age: 70
Setting detail: THERAPIES SERIES
Discharge: HOME | End: 2020-11-18
Payer: MEDICARE

## 2020-11-18 PROCEDURE — 93798 PHYS/QHP OP CAR RHAB W/ECG: CPT

## 2020-11-20 ENCOUNTER — HOSPITAL ENCOUNTER (OUTPATIENT)
Dept: CARDIAC REHAB | Age: 70
Setting detail: THERAPIES SERIES
Discharge: HOME | End: 2020-11-20
Payer: MEDICARE

## 2020-11-20 PROCEDURE — 93798 PHYS/QHP OP CAR RHAB W/ECG: CPT

## 2020-11-23 ENCOUNTER — HOSPITAL ENCOUNTER (OUTPATIENT)
Dept: CARDIAC REHAB | Age: 70
Setting detail: THERAPIES SERIES
Discharge: HOME | End: 2020-11-23
Payer: MEDICARE

## 2020-11-23 PROCEDURE — 93798 PHYS/QHP OP CAR RHAB W/ECG: CPT

## 2020-11-25 ENCOUNTER — HOSPITAL ENCOUNTER (OUTPATIENT)
Dept: CARDIAC REHAB | Age: 70
Setting detail: THERAPIES SERIES
Discharge: HOME | End: 2020-11-25
Payer: MEDICARE

## 2020-11-25 PROCEDURE — 93798 PHYS/QHP OP CAR RHAB W/ECG: CPT

## 2020-11-30 ENCOUNTER — TELEPHONE (OUTPATIENT)
Dept: CARDIOLOGY CLINIC | Age: 70
End: 2020-11-30

## 2020-11-30 ENCOUNTER — HOSPITAL ENCOUNTER (OUTPATIENT)
Dept: CARDIAC REHAB | Age: 70
Setting detail: THERAPIES SERIES
Discharge: HOME | End: 2020-11-30
Payer: MEDICARE

## 2020-12-02 ENCOUNTER — HOSPITAL ENCOUNTER (OUTPATIENT)
Dept: CARDIAC REHAB | Age: 70
Setting detail: THERAPIES SERIES
Discharge: HOME | End: 2020-12-02
Payer: MEDICARE

## 2020-12-02 PROCEDURE — 93798 PHYS/QHP OP CAR RHAB W/ECG: CPT

## 2020-12-04 ENCOUNTER — HOSPITAL ENCOUNTER (OUTPATIENT)
Dept: CARDIAC REHAB | Age: 70
Setting detail: THERAPIES SERIES
Discharge: HOME | End: 2020-12-04
Payer: MEDICARE

## 2020-12-07 ENCOUNTER — HOSPITAL ENCOUNTER (OUTPATIENT)
Dept: CARDIAC REHAB | Age: 70
Setting detail: THERAPIES SERIES
Discharge: HOME | End: 2020-12-07
Payer: MEDICARE

## 2020-12-07 PROCEDURE — 93798 PHYS/QHP OP CAR RHAB W/ECG: CPT

## 2020-12-09 ENCOUNTER — HOSPITAL ENCOUNTER (OUTPATIENT)
Dept: CARDIAC REHAB | Age: 70
Setting detail: THERAPIES SERIES
Discharge: HOME | End: 2020-12-09
Payer: MEDICARE

## 2020-12-09 PROCEDURE — 93798 PHYS/QHP OP CAR RHAB W/ECG: CPT

## 2020-12-11 ENCOUNTER — HOSPITAL ENCOUNTER (OUTPATIENT)
Dept: CARDIAC REHAB | Age: 70
Setting detail: THERAPIES SERIES
Discharge: HOME | End: 2020-12-11
Payer: MEDICARE

## 2020-12-11 PROCEDURE — 93798 PHYS/QHP OP CAR RHAB W/ECG: CPT

## 2020-12-14 ENCOUNTER — HOSPITAL ENCOUNTER (OUTPATIENT)
Dept: CARDIAC REHAB | Age: 70
Setting detail: THERAPIES SERIES
Discharge: HOME | End: 2020-12-14
Payer: MEDICARE

## 2020-12-14 PROCEDURE — 93798 PHYS/QHP OP CAR RHAB W/ECG: CPT

## 2020-12-16 ENCOUNTER — HOSPITAL ENCOUNTER (OUTPATIENT)
Dept: CARDIAC REHAB | Age: 70
Setting detail: THERAPIES SERIES
Discharge: HOME | End: 2020-12-16
Payer: MEDICARE

## 2020-12-16 PROCEDURE — 93798 PHYS/QHP OP CAR RHAB W/ECG: CPT

## 2020-12-18 ENCOUNTER — HOSPITAL ENCOUNTER (OUTPATIENT)
Dept: CARDIAC REHAB | Age: 70
Setting detail: THERAPIES SERIES
Discharge: HOME | End: 2020-12-18
Payer: MEDICARE

## 2020-12-18 PROCEDURE — 93798 PHYS/QHP OP CAR RHAB W/ECG: CPT

## 2020-12-21 ENCOUNTER — HOSPITAL ENCOUNTER (OUTPATIENT)
Dept: CARDIAC REHAB | Age: 70
Setting detail: THERAPIES SERIES
Discharge: HOME | End: 2020-12-21
Payer: MEDICARE

## 2020-12-21 PROCEDURE — 93798 PHYS/QHP OP CAR RHAB W/ECG: CPT

## 2020-12-23 ENCOUNTER — HOSPITAL ENCOUNTER (OUTPATIENT)
Dept: CARDIAC REHAB | Age: 70
Setting detail: THERAPIES SERIES
Discharge: HOME | End: 2020-12-23
Payer: MEDICARE

## 2020-12-23 PROCEDURE — 93798 PHYS/QHP OP CAR RHAB W/ECG: CPT

## 2020-12-28 ENCOUNTER — HOSPITAL ENCOUNTER (OUTPATIENT)
Dept: CARDIAC REHAB | Age: 70
Setting detail: THERAPIES SERIES
Discharge: HOME | End: 2020-12-28
Payer: MEDICARE

## 2020-12-28 PROCEDURE — 93798 PHYS/QHP OP CAR RHAB W/ECG: CPT

## 2020-12-30 ENCOUNTER — HOSPITAL ENCOUNTER (OUTPATIENT)
Dept: CARDIAC REHAB | Age: 70
Setting detail: THERAPIES SERIES
Discharge: HOME | End: 2020-12-30
Payer: MEDICARE

## 2020-12-30 PROCEDURE — 93798 PHYS/QHP OP CAR RHAB W/ECG: CPT

## 2021-01-04 ENCOUNTER — HOSPITAL ENCOUNTER (OUTPATIENT)
Dept: CARDIAC REHAB | Age: 71
Setting detail: THERAPIES SERIES
Discharge: HOME | End: 2021-01-04
Payer: MEDICARE

## 2021-01-04 PROCEDURE — 93798 PHYS/QHP OP CAR RHAB W/ECG: CPT

## 2021-01-06 ENCOUNTER — HOSPITAL ENCOUNTER (OUTPATIENT)
Dept: CARDIAC REHAB | Age: 71
Setting detail: THERAPIES SERIES
Discharge: HOME | End: 2021-01-06
Payer: MEDICARE

## 2021-01-06 PROCEDURE — 93798 PHYS/QHP OP CAR RHAB W/ECG: CPT

## 2021-01-08 ENCOUNTER — HOSPITAL ENCOUNTER (OUTPATIENT)
Dept: CARDIAC REHAB | Age: 71
Setting detail: THERAPIES SERIES
Discharge: HOME | End: 2021-01-08
Payer: MEDICARE

## 2021-01-08 PROCEDURE — 93798 PHYS/QHP OP CAR RHAB W/ECG: CPT

## 2021-01-11 ENCOUNTER — HOSPITAL ENCOUNTER (OUTPATIENT)
Dept: CARDIAC REHAB | Age: 71
Setting detail: THERAPIES SERIES
Discharge: HOME | End: 2021-01-11
Payer: MEDICARE

## 2021-01-11 PROCEDURE — 93798 PHYS/QHP OP CAR RHAB W/ECG: CPT

## 2021-01-13 ENCOUNTER — HOSPITAL ENCOUNTER (OUTPATIENT)
Dept: CARDIAC REHAB | Age: 71
Setting detail: THERAPIES SERIES
Discharge: HOME | End: 2021-01-13
Payer: MEDICARE

## 2021-01-13 PROCEDURE — 93798 PHYS/QHP OP CAR RHAB W/ECG: CPT

## 2021-01-15 ENCOUNTER — HOSPITAL ENCOUNTER (OUTPATIENT)
Dept: CARDIAC REHAB | Age: 71
Setting detail: THERAPIES SERIES
Discharge: HOME | End: 2021-01-15
Payer: MEDICARE

## 2021-01-15 PROCEDURE — 93798 PHYS/QHP OP CAR RHAB W/ECG: CPT

## 2021-01-18 ENCOUNTER — HOSPITAL ENCOUNTER (OUTPATIENT)
Dept: CARDIAC REHAB | Age: 71
Setting detail: THERAPIES SERIES
Discharge: HOME | End: 2021-01-18
Payer: MEDICARE

## 2021-01-18 PROCEDURE — 93798 PHYS/QHP OP CAR RHAB W/ECG: CPT

## 2021-01-20 ENCOUNTER — HOSPITAL ENCOUNTER (OUTPATIENT)
Dept: CARDIAC REHAB | Age: 71
Setting detail: THERAPIES SERIES
Discharge: HOME | End: 2021-01-20
Payer: MEDICARE

## 2021-01-22 ENCOUNTER — HOSPITAL ENCOUNTER (OUTPATIENT)
Dept: CARDIAC REHAB | Age: 71
Setting detail: THERAPIES SERIES
Discharge: HOME | End: 2021-01-22
Payer: MEDICARE

## 2021-01-25 ENCOUNTER — HOSPITAL ENCOUNTER (OUTPATIENT)
Dept: CARDIAC REHAB | Age: 71
Setting detail: THERAPIES SERIES
Discharge: HOME | End: 2021-01-25
Payer: MEDICARE

## 2021-01-25 PROCEDURE — 93798 PHYS/QHP OP CAR RHAB W/ECG: CPT

## 2021-01-27 ENCOUNTER — HOSPITAL ENCOUNTER (OUTPATIENT)
Dept: CARDIAC REHAB | Age: 71
Setting detail: THERAPIES SERIES
Discharge: HOME | End: 2021-01-27
Payer: MEDICARE

## 2021-01-27 PROCEDURE — 93798 PHYS/QHP OP CAR RHAB W/ECG: CPT

## 2021-01-29 ENCOUNTER — HOSPITAL ENCOUNTER (OUTPATIENT)
Dept: CARDIAC REHAB | Age: 71
Setting detail: THERAPIES SERIES
Discharge: HOME | End: 2021-01-29
Payer: MEDICARE

## 2021-01-29 PROCEDURE — 93798 PHYS/QHP OP CAR RHAB W/ECG: CPT

## 2021-02-03 ENCOUNTER — HOSPITAL ENCOUNTER (OUTPATIENT)
Dept: CARDIAC REHAB | Age: 71
Setting detail: THERAPIES SERIES
Discharge: HOME | End: 2021-02-03
Payer: MEDICARE

## 2021-02-03 PROCEDURE — 93798 PHYS/QHP OP CAR RHAB W/ECG: CPT

## 2021-02-22 ENCOUNTER — TELEPHONE (OUTPATIENT)
Dept: CARDIOLOGY CLINIC | Age: 71
End: 2021-02-22

## 2021-02-22 NOTE — TELEPHONE ENCOUNTER
DOS 11/10/2020 $159.00- if the patient gets a stmt for no Auth.-on 2/22/2021, we got an Auth#  [id number]  Valid 11/10/20 - 5/9/21 - if this needs to be refiled. Have to wait until we get a response from the ins.

## 2021-03-08 LAB
ALBUMIN SERPL-MCNC: NORMAL G/DL
ALP BLD-CCNC: NORMAL U/L
ALT SERPL-CCNC: 32 U/L
ANION GAP SERPL CALCULATED.3IONS-SCNC: 4 MMOL/L
AST SERPL-CCNC: 27 U/L
AVERAGE GLUCOSE: NORMAL
BILIRUB SERPL-MCNC: NORMAL MG/DL
BUN BLDV-MCNC: 26 MG/DL
CALCIUM SERPL-MCNC: 9.2 MG/DL
CHLORIDE BLD-SCNC: 103 MMOL/L
CHOLESTEROL, TOTAL: 114 MG/DL
CHOLESTEROL/HDL RATIO: ABNORMAL
CO2: 28 MMOL/L
CREAT SERPL-MCNC: 1.3 MG/DL
GFR CALCULATED: NORMAL
GLUCOSE BLD-MCNC: 198 MG/DL
HBA1C MFR BLD: 8.2 %
HDLC SERPL-MCNC: 30 MG/DL (ref 35–70)
LDL CHOLESTEROL CALCULATED: 41 MG/DL (ref 0–160)
NONHDLC SERPL-MCNC: ABNORMAL MG/DL
POTASSIUM SERPL-SCNC: 4.5 MMOL/L
SODIUM BLD-SCNC: 135 MMOL/L
TOTAL PROTEIN: NORMAL
TRIGL SERPL-MCNC: 364 MG/DL
VLDLC SERPL CALC-MCNC: ABNORMAL MG/DL

## 2021-03-22 ENCOUNTER — OFFICE VISIT (OUTPATIENT)
Dept: CARDIOLOGY CLINIC | Age: 71
End: 2021-03-22
Payer: OTHER GOVERNMENT

## 2021-03-22 VITALS
BODY MASS INDEX: 36.99 KG/M2 | OXYGEN SATURATION: 98 % | WEIGHT: 264.2 LBS | HEART RATE: 68 BPM | TEMPERATURE: 97.1 F | SYSTOLIC BLOOD PRESSURE: 104 MMHG | DIASTOLIC BLOOD PRESSURE: 70 MMHG | HEIGHT: 71 IN | RESPIRATION RATE: 16 BRPM

## 2021-03-22 DIAGNOSIS — I10 ESSENTIAL HYPERTENSION: Primary | ICD-10-CM

## 2021-03-22 DIAGNOSIS — E78.2 MIXED HYPERLIPIDEMIA: ICD-10-CM

## 2021-03-22 DIAGNOSIS — I25.10 ASCVD (ARTERIOSCLEROTIC CARDIOVASCULAR DISEASE): ICD-10-CM

## 2021-03-22 PROCEDURE — G8484 FLU IMMUNIZE NO ADMIN: HCPCS | Performed by: NURSE PRACTITIONER

## 2021-03-22 PROCEDURE — 99214 OFFICE O/P EST MOD 30 MIN: CPT | Performed by: NURSE PRACTITIONER

## 2021-03-22 PROCEDURE — G8417 CALC BMI ABV UP PARAM F/U: HCPCS | Performed by: NURSE PRACTITIONER

## 2021-03-22 PROCEDURE — 3017F COLORECTAL CA SCREEN DOC REV: CPT | Performed by: NURSE PRACTITIONER

## 2021-03-22 PROCEDURE — G8427 DOCREV CUR MEDS BY ELIG CLIN: HCPCS | Performed by: NURSE PRACTITIONER

## 2021-03-22 PROCEDURE — 1036F TOBACCO NON-USER: CPT | Performed by: NURSE PRACTITIONER

## 2021-03-22 PROCEDURE — 4040F PNEUMOC VAC/ADMIN/RCVD: CPT | Performed by: NURSE PRACTITIONER

## 2021-03-22 PROCEDURE — 1123F ACP DISCUSS/DSCN MKR DOCD: CPT | Performed by: NURSE PRACTITIONER

## 2021-03-22 RX ORDER — ISOSORBIDE MONONITRATE 30 MG/1
30 TABLET, EXTENDED RELEASE ORAL DAILY
Qty: 30 TABLET | Refills: 3 | Status: CANCELLED | OUTPATIENT
Start: 2021-03-22

## 2021-03-22 ASSESSMENT — ENCOUNTER SYMPTOMS
SHORTNESS OF BREATH: 0
ORTHOPNEA: 0

## 2021-03-22 NOTE — PATIENT INSTRUCTIONS
Please be informed that if you contact our office outside of normal business hours the physician on call cannot help with any scheduling or rescheduling issues, procedure instruction questions or any type of medication issue. We advise you for any urgent/emergency that you go to the nearest emergency room! PLEASE CALL OUR OFFICE DURING NORMAL BUSINESS HOURS    Monday - Friday   8 am to 5 pm    [address]: [phone]    [place]:  [phone]      Please hold on to these instructions the  will call you within 1-9 business days when we receive authorization from your insurance. Nuclear Stress Test    WHAT TO EXPECT:   ? You will need to confirm the test or it could be cancelled. ? This test will take approximately 2 hours: 1 hour in the AM &    1 hour in the PM. You will be given a time by the Technologist after the first part is completed to come back. ? You will be given a medication, through an IV in the hand, this will safely simulate exercise. This IV is also needed to inject the radioactive isotope unless you are able toe walk the treadmill. ? You will receive an injection in the AM & PM before the pictures. ? Using a special camera, you will have one set of pictures of your heart taken in the AM and a set of pictures in the PM.     PREPARATION FOR TEST:  ? Eat a light breakfast such as water or juice and toast.  ? If you are DIABETIC: Eat a normal breakfast with NO CAFFEINE and take your insulin as normal.   ? AVOID ALL FOODS & DRINKS containing CAFFEINE 12 HOURS PRIOR TO THE TEST: Including coffee, Tea, Gurvinder and other soft drinks even those labeled  caffeine free or decaffeinated.  ? HOLD THESE MEDICATIONS Persantine & Theophylline (Theodur)  24 hours prior & bring your inhaler with you.    The physician will specify if the following Beta blockers need to be held for 24 hours prior to test: Coreg (carvedilol)

## 2021-03-22 NOTE — PROGRESS NOTES
3/29/2021  Primary cardiologist: Dr. Rachel Valente  is an established 69 y.o.  male here for follow-up on ASVCD  HTN HLD  DM      SUBJECTIVE/OBJECTIVE:  HPI : Lance Leon is a 58-year-old gentleman who was initially seen as a consult September 2020 with complaints of chest pain. He underwent a left heart catheterization which showed very critical two-vessel CAD underwent PCI to the LAD and the RCA. Lance Leon reports he tires easily. He notes he works for about 10 minutes then needs to sit down. He reports chest pain that is similar to when he had his stent. The pain is to the left breast area- it comes and goes - it last for a few minutes- there are no aggravating factors - the pain goes away on its own. Review of Systems   Constitution: Negative for diaphoresis and malaise/fatigue. Cardiovascular: Positive for chest pain. Negative for claudication, dyspnea on exertion, irregular heartbeat, leg swelling, near-syncope, orthopnea, palpitations and paroxysmal nocturnal dyspnea. Respiratory: Negative for shortness of breath. Neurological: Negative for dizziness and light-headedness. Vitals:    03/22/21 0953   BP: 104/70   Site: Left Upper Arm   Position: Sitting   Cuff Size: Large Adult   Pulse: 68   Resp: 16   Temp: 97.1 °F (36.2 °C)   SpO2: 98%   Weight: 264 lb 3.2 oz (119.8 kg)   Height: 5' 11\" (1.803 m)     /70 (Site: Left Upper Arm, Position: Sitting, Cuff Size: Large Adult)   Pulse 68   Temp 97.1 °F (36.2 °C)   Resp 16   Ht 5' 11\" (1.803 m)   Wt 264 lb 3.2 oz (119.8 kg)   SpO2 98%   BMI 36.85 kg/m²   No flowsheet data found. Wt Readings from Last 3 Encounters:   03/22/21 264 lb 3.2 oz (119.8 kg)   11/10/20 265 lb (120.2 kg)   10/14/20 261 lb (118.4 kg)     Body mass index is 36.85 kg/m².     Physical Exam :     Neck: supple,  no carotid bruit appreciated, JVD not appreciated    Respiratory:  Normal breath sounds, No respiratory distress, No wheezing    Cardiovascular:  S1 S2 mellitus  Blood sugar is not well controlled  Hemoglobin A1c 8.4  To monitor carbohydrates / diet better      Medications reviewed and confirmed with patient     Tests ordered:  NM stress test     OV in 3 months     Signed:  Susy Goldmann, 3/29/2021, 11:14 AM    An electronic signature was used to authenticate this note.

## 2021-03-22 NOTE — LETTER
[facility]    Dr. Javier Álvarez  7/3/1620  [id number]    Have you had any Chest Pain that is not new? - No    Have you had any Shortness of Breath - Yes  If Yes - When on exertion    Have you had any dizziness - No    Have you had any palpitations that are not new? - No    Do you have any edema - swelling in No      When did you have your last labs drawn 2 weeks ago  Where did you have them done [place]  [facility] ordered [facility]    If we do not have these labs you are retrieve these labs for these providers!     Do you have a surgery or procedure scheduled in the near future - No     Ask patient if they want to sign up for [facility] if they are not already signed up     Check to see if we have an E-MAIL on file for the patient     Check medication list thoroughly!!! AND RECONCILE OUTSIDE MEDICATIONS  If dose has changed change the entire order not just the MG  BE SURE TO ASK PATIENT IF THEY NEED MEDICATION REFILLS     At check out add to every patient's \"wrap up\" the following dot phrase AFTERHOURSEDUCATION and ensure we explain this to our patients

## 2021-03-29 ENCOUNTER — PROCEDURE VISIT (OUTPATIENT)
Dept: CARDIOLOGY CLINIC | Age: 71
End: 2021-03-29
Payer: OTHER GOVERNMENT

## 2021-03-29 ENCOUNTER — TELEPHONE (OUTPATIENT)
Dept: CARDIOLOGY CLINIC | Age: 71
End: 2021-03-29

## 2021-03-29 DIAGNOSIS — I25.10 ASCVD (ARTERIOSCLEROTIC CARDIOVASCULAR DISEASE): ICD-10-CM

## 2021-03-29 DIAGNOSIS — R07.9 CHEST PAIN, UNSPECIFIED TYPE: Primary | ICD-10-CM

## 2021-03-29 LAB
LV EF: 60 %
LVEF MODALITY: NORMAL

## 2021-03-29 PROCEDURE — 93017 CV STRESS TEST TRACING ONLY: CPT | Performed by: INTERNAL MEDICINE

## 2021-03-29 PROCEDURE — 93018 CV STRESS TEST I&R ONLY: CPT | Performed by: INTERNAL MEDICINE

## 2021-03-29 PROCEDURE — 93016 CV STRESS TEST SUPVJ ONLY: CPT | Performed by: INTERNAL MEDICINE

## 2021-03-29 PROCEDURE — A9500 TC99M SESTAMIBI: HCPCS | Performed by: INTERNAL MEDICINE

## 2021-03-29 PROCEDURE — 78452 HT MUSCLE IMAGE SPECT MULT: CPT | Performed by: INTERNAL MEDICINE

## 2021-03-29 NOTE — TELEPHONE ENCOUNTER
L/M on pt's v/m to return my call. After reviewing pt's Lipid panel, Nenita Lang CNP is advising pt to eat a low fat/low carb diet due to HDL being low and Trig being elevated; also add healthy nuts: Walnuts and flaxseed.

## 2021-03-29 NOTE — PROGRESS NOTES
10:05 AM    3/29/21    Site: antecubital right, condition patent and no redness    # of attempts: 1 Provider at bedside       Gosia Urbina RN  99/73/63 [id number]

## 2021-03-30 NOTE — TELEPHONE ENCOUNTER
Pt returned my call and was given the information and instructions per Angella Shah CNP. Pt also advised of Lexiscan stress test results and he verbalized understanding. .    Summary    Supervising physician Dr. Latasha Andino tracer uptake in all segments of myocardium on stress ans rest    images. Normal Lexiscan nuclear scintigraphic study suggestive of normal    myocardial perfusion. Gated images demonstrate normal left ventricular    systolic function with EF of 60 %.         Signatures        ------------------------------------------------------------------    Electronically signed by Yifan Baca MD   [id number] cardiologist) on 03/29/2021 at 15:18

## 2021-06-24 ENCOUNTER — APPOINTMENT (OUTPATIENT)
Dept: GENERAL RADIOLOGY | Age: 71
End: 2021-06-24
Payer: MEDICARE

## 2021-06-24 ENCOUNTER — HOSPITAL ENCOUNTER (EMERGENCY)
Age: 71
Discharge: HOME | End: 2021-06-24
Attending: EMERGENCY MEDICINE
Payer: MEDICARE

## 2021-06-24 VITALS
BODY MASS INDEX: 37.66 KG/M2 | SYSTOLIC BLOOD PRESSURE: 94 MMHG | HEIGHT: 71 IN | OXYGEN SATURATION: 97 % | TEMPERATURE: 98 F | RESPIRATION RATE: 16 BRPM | WEIGHT: 269 LBS | HEART RATE: 46 BPM | DIASTOLIC BLOOD PRESSURE: 65 MMHG

## 2021-06-24 DIAGNOSIS — N28.9 ACUTE RENAL INSUFFICIENCY: ICD-10-CM

## 2021-06-24 DIAGNOSIS — R77.8 TROPONIN I ABOVE REFERENCE RANGE: ICD-10-CM

## 2021-06-24 DIAGNOSIS — I95.2 HYPOTENSION DUE TO DRUGS: Primary | ICD-10-CM

## 2021-06-24 LAB
ALBUMIN SERPL-MCNC: 4.3 GM/DL (ref 3.4–5)
ALP BLD-CCNC: 61 IU/L (ref 40–129)
ALT SERPL-CCNC: 24 U/L (ref 10–40)
ANION GAP SERPL CALCULATED.3IONS-SCNC: 14 MMOL/L (ref 4–16)
AST SERPL-CCNC: 27 IU/L (ref 15–37)
BASOPHILS ABSOLUTE: 0.1 K/CU MM
BASOPHILS RELATIVE PERCENT: 0.8 % (ref 0–1)
BILIRUB SERPL-MCNC: 0.6 MG/DL (ref 0–1)
BUN BLDV-MCNC: 47 MG/DL (ref 6–23)
CALCIUM SERPL-MCNC: 9.7 MG/DL (ref 8.3–10.6)
CHLORIDE BLD-SCNC: 98 MMOL/L (ref 99–110)
CO2: 20 MMOL/L (ref 21–32)
CREAT SERPL-MCNC: 1.8 MG/DL (ref 0.9–1.3)
DIFFERENTIAL TYPE: ABNORMAL
EKG ATRIAL RATE: 65 BPM
EKG DIAGNOSIS: NORMAL
EKG P-R INTERVAL: 182 MS
EKG Q-T INTERVAL: 374 MS
EKG QRS DURATION: 102 MS
EKG QTC CALCULATION (BAZETT): 388 MS
EKG R AXIS: -16 DEGREES
EKG T AXIS: -69 DEGREES
EKG VENTRICULAR RATE: 65 BPM
EOSINOPHILS ABSOLUTE: 0.4 K/CU MM
EOSINOPHILS RELATIVE PERCENT: 6.7 % (ref 0–3)
GFR AFRICAN AMERICAN: 45 ML/MIN/1.73M2
GFR NON-AFRICAN AMERICAN: 37 ML/MIN/1.73M2
GLUCOSE BLD-MCNC: 197 MG/DL (ref 70–99)
HCT VFR BLD CALC: 39.7 % (ref 42–52)
HEMOGLOBIN: 13.7 GM/DL (ref 13.5–18)
IMMATURE NEUTROPHIL %: 0.3 % (ref 0–0.43)
LYMPHOCYTES ABSOLUTE: 1.2 K/CU MM
LYMPHOCYTES RELATIVE PERCENT: 19.5 % (ref 24–44)
MCH RBC QN AUTO: 31.9 PG (ref 27–31)
MCHC RBC AUTO-ENTMCNC: 34.5 % (ref 32–36)
MCV RBC AUTO: 92.5 FL (ref 78–100)
MONOCYTES ABSOLUTE: 1.2 K/CU MM
MONOCYTES RELATIVE PERCENT: 19.5 % (ref 0–4)
PDW BLD-RTO: 13.6 % (ref 11.7–14.9)
PLATELET # BLD: 182 K/CU MM (ref 140–440)
PMV BLD AUTO: 10.2 FL (ref 7.5–11.1)
POTASSIUM SERPL-SCNC: 4.7 MMOL/L (ref 3.5–5.1)
PRO-BNP: 60.5 PG/ML
RBC # BLD: 4.29 M/CU MM (ref 4.6–6.2)
SEGMENTED NEUTROPHILS ABSOLUTE COUNT: 3.2 K/CU MM
SEGMENTED NEUTROPHILS RELATIVE PERCENT: 53.2 % (ref 36–66)
SODIUM BLD-SCNC: 132 MMOL/L (ref 135–145)
TOTAL IMMATURE NEUTOROPHIL: 0.02 K/CU MM
TOTAL PROTEIN: 7.3 GM/DL (ref 6.4–8.2)
TROPONIN T: 0.06 NG/ML
TROPONIN T: 0.07 NG/ML
WBC # BLD: 6 K/CU MM (ref 4–10.5)

## 2021-06-24 PROCEDURE — 2580000003 HC RX 258: Performed by: EMERGENCY MEDICINE

## 2021-06-24 PROCEDURE — 96361 HYDRATE IV INFUSION ADD-ON: CPT

## 2021-06-24 PROCEDURE — 93010 ELECTROCARDIOGRAM REPORT: CPT | Performed by: INTERNAL MEDICINE

## 2021-06-24 PROCEDURE — 83880 ASSAY OF NATRIURETIC PEPTIDE: CPT

## 2021-06-24 PROCEDURE — 84484 ASSAY OF TROPONIN QUANT: CPT

## 2021-06-24 PROCEDURE — 96360 HYDRATION IV INFUSION INIT: CPT

## 2021-06-24 PROCEDURE — 85025 COMPLETE CBC W/AUTO DIFF WBC: CPT

## 2021-06-24 PROCEDURE — 93005 ELECTROCARDIOGRAM TRACING: CPT | Performed by: EMERGENCY MEDICINE

## 2021-06-24 PROCEDURE — 80053 COMPREHEN METABOLIC PANEL: CPT

## 2021-06-24 PROCEDURE — 99285 EMERGENCY DEPT VISIT HI MDM: CPT

## 2021-06-24 PROCEDURE — 71045 X-RAY EXAM CHEST 1 VIEW: CPT

## 2021-06-24 RX ORDER — POLYETHYLENE GLYCOL 3350 17 G/17G
17 POWDER, FOR SOLUTION ORAL DAILY PRN
COMMUNITY

## 2021-06-24 RX ORDER — LORATADINE 10 MG/1
10 CAPSULE, LIQUID FILLED ORAL DAILY
COMMUNITY

## 2021-06-24 RX ORDER — FINASTERIDE 5 MG/1
5 TABLET, FILM COATED ORAL DAILY
COMMUNITY

## 2021-06-24 RX ORDER — 0.9 % SODIUM CHLORIDE 0.9 %
1000 INTRAVENOUS SOLUTION INTRAVENOUS ONCE
Status: COMPLETED | OUTPATIENT
Start: 2021-06-24 | End: 2021-06-24

## 2021-06-24 RX ADMIN — SODIUM CHLORIDE 1000 ML: 9 INJECTION, SOLUTION INTRAVENOUS at 14:25

## 2021-06-24 RX ADMIN — SODIUM CHLORIDE 1000 ML: 9 INJECTION, SOLUTION INTRAVENOUS at 13:05

## 2021-06-24 NOTE — ED PROVIDER NOTES
eMERGENCY dEPARTMENT eNCOUnter      PCP: No primary care provider on file. CHIEF COMPLAINT    Chief Complaint   Patient presents with    Shortness of Breath     onset last September. Worse last few days.  Dizziness    Nausea    Cough     x 2 1/2 months. Productive-greenish       HPI    Radha Parikh is a 69 y.o. male who presents with dizziness, shortness of breath, cough. States symptoms started back in September when he had 5 stents placed. States since that time he been having episodes of shortness of breath. States that today he went out to mow the lawn and while doing so got lightheaded, nauseated and felt more short of breath. He states that he then went back inside because he was feeling lightheaded. He states that sitting down seem to improve the lightheadedness. He denies associated chest pain or palpitations. He states that symptoms have improved since coming in from outside. Denies lower extremity edema. States he has had a cough, reports has been present since September as well. States that it seems to worsen the last 2-1/2 months. He states he has a scheduled appointment in July with pulmonology for a pulmonary function test. He has a scheduled appointment tomorrow with cardiology. REVIEW OF SYSTEMS    Constitutional:  Denies fever, chills.    HENT:  Denies sore throat or ear pain   Cardiovascular:  Denies chest pain, palpitations   Respiratory:  + cough and shortness of breath    GI:  Denies abdominal pain, + nausea, denies vomiting, or diarrhea  :  Denies any urinary symptoms, flank pain  Musculoskeletal:  Denies back pain, extremity pain  Skin:  Denies rash, color change  Neurologic:  Denies headache, focal weakness or sensory changes   Lymphatic:  Denies swollen glands, edema    All other review of systems are negative  See HPI and nursing notes for additional information     PAST MEDICAL AND SURGICAL HISTORY    Past Medical History:   Diagnosis Date    Class 2 obesity with body mass index (BMI) of 36.0 to 36.9 in adult     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  Neuropathy 01/22/2016    NSTEMI (non-ST elevated myocardial infarction) ([facility] 09/29/2020    Post PTCA 09/29/2020    Stenting to prox.& distal RCA, along w/to prox.& distal LAD.  Uses hearing aid     right ear     Past Surgical History:   Procedure Laterality Date    BACK SURGERY      COLONOSCOPY      LAMINECTOMY  5/20/12    L3-4 L4-5 decom ryan    PILONIDAL CYST EXCISION      PILONIDAL CYST EXCISION  1975    TURP      TURP  2005       CURRENT MEDICATIONS    Current Outpatient Rx   Medication Sig Dispense Refill    glipiZIDE (GLUCOTROL) 5 MG tablet Take 5 mg by mouth 2 times daily      metFORMIN (GLUCOPHAGE) 500 MG tablet Take 500 mg by mouth 2 times daily (with meals)      cloNIDine (CATAPRES) 0.2 MG tablet 1 tablet 2 times daily      carvedilol (COREG) 6.25 MG tablet Take 1 tablet by mouth 2 times daily 60 tablet 0    nitroGLYCERIN (NITROSTAT) 0.4 MG SL tablet up to max of 3 total doses.  If no relief after 1 dose, call 911. 25 tablet 3    ticagrelor (BRILINTA) 90 MG TABS tablet Take 1 tablet by mouth 2 times daily 60 tablet 3    aspirin 81 MG chewable tablet Take 1 tablet by mouth daily 30 tablet 3    insulin glargine (LANTUS) 100 UNIT/ML injection vial Inject 40 Units into the skin nightly       alogliptin (NESINA) 25 MG TABS tablet Take 12.5 mg by mouth daily       benazepril (LOTENSIN) 40 MG tablet Take 1 tablet by mouth daily 90 tablet 1    oxybutynin (DITROPAN) 5 MG tablet Take 1 tablet by mouth 3 times daily 270 tablet 1    atorvastatin (LIPITOR) 20 MG tablet Take 1 tablet by mouth daily 90 tablet 1    chlorthalidone (HYGROTON) 25 MG tablet Take 25 mg by mouth daily      spironolactone (ALDACTONE) 25 MG tablet Take 1 tablet by mouth daily 90 tablet 3    Blood Glucose Monitoring Suppl (ACURA BLOOD GLUCOSE METER) W/DEVICE KIT 1 kit by Does not apply route once for 1 dose 1 kit 0       ALLERGIES    Allergies   Allergen Reactions    Celebrex [Celecoxib]      From old chart     Cialis [Tadalafil]      From old chart     Duloxetine Hcl Other (See Comments)     Caused depression    Gabapentin Other (See Comments)     Caused depression    Lyrica [Pregabalin]      Confusion      Pravastatin      From old chart     Zetia [Ezetimibe]      From old chart        SOCIAL AND FAMILY HISTORY    Social History     Socioeconomic History    Marital status:      Spouse name: Not on file    Number of children: Not on file    Years of education: Not on file    Highest education level: Not on file   Occupational History    Not on file   Tobacco Use    Smoking status: Former Smoker     Packs/day: 2.50     Years: 20.00     Pack years: 50.00     Quit date: 5/10/1989     Years since quittin.1    Smokeless tobacco: Never Used   Vaping Use    Vaping Use: Never used   Substance and Sexual Activity    Alcohol use: Yes     Comment: rare    Drug use: No    Sexual activity: Yes   Other Topics Concern    Not on file   Social History Narrative    ** Merged History Encounter **          Social Determinants of Health     Financial Resource Strain:     Difficulty of Paying Living Expenses:    Food Insecurity:     Worried About Running Out of Food in the Last Year:     Ran Out of Food in the Last Year:    Transportation Needs:     Lack of Transportation (Medical):      Lack of Transportation (Non-Medical):    Physical Activity:     Days of Exercise per Week:     Minutes of Exercise per Session:    Stress:     Feeling of Stress :    Social Connections:     Frequency of Communication limits   COMPREHENSIVE METABOLIC PANEL - Abnormal; Notable for the following components:    Sodium 132 (*)     Chloride 98 (*)     CO2 20 (*)     BUN 47 (*)     CREATININE 1.8 (*)     Glucose 197 (*)     GFR Non- 37 (*)     GFR  45 (*)     All other components within normal limits   TROPONIN - Abnormal; Notable for the following components:    Troponin T 0.069 (*)     All other components within normal limits   TROPONIN - Abnormal; Notable for the following components:    Troponin T 0.055 (*)     All other components within normal limits   BRAIN NATRIURETIC PEPTIDE         EKG    This EKG was interpreted by me. Rate is 65, rhythm is sinus. [place] and QT intervals are within normal limits. No ST abnormalities. T waves are inverted in leads II, 3, aVF. This was compared to previous EKG from 10-7-2020. RADIOLOGY    XR CHEST PORTABLE    Result Date: 6/24/2021  EXAMINATION: ONE XRAY VIEW OF THE CHEST 6/24/2021 1:14 pm COMPARISON: September 28, 2020 HISTORY: ORDERING SYSTEM PROVIDED HISTORY: shortness of breath, cough TECHNOLOGIST PROVIDED HISTORY: Reason for exam:->shortness of breath, cough Reason for Exam: Shortness of Breath; Dizziness; Nausea; Cough Acuity: Acute Type of Exam: Initial Relevant Medical/Surgical History: HTN,PTCA FINDINGS: The cardiomediastinal silhouette is within normal range. Lungs are clear. There is no focal pulmonary consolidation, pleural effusion, pneumothorax, or evidence of airspace pulmonary edema. 1. No acute radiographic abnormality in the chest.         ED COURSE & MEDICAL DECISION MAKING       Vital signs and nursing notes reviewed during ED course. All pertinent Lab data and radiographic results reviewed with patient at bedside. The patient and/or the family were informed of the results of any tests/labs/imaging, the treatment plan, and time was allotted to answer questions.      This is a 76-year-old male who presented due to lightheadedness. On initial evaluation his blood pressure was low in the 34X systolic. He was reporting to feel better than he was when he was outside. Work-up was initiated. Troponin was detectable 0.069. Creatinine is elevated at 1.8. He states his most recent labs the creatinine was 1.27. This may be accounting for the detectable troponin. His EKG did show inverted T waves in the inferior leads, these are likely old, however his comparison from last year in the fall did not show inversions present. I discussed this with him. He on discussion was feeling much better after IV fluids. His blood pressure did take some time to increase, thus we discussed other etiologies. I have low suspicion for aortic dissection, intra-abdominal bleeding as he has no complaint of chest pain or abdominal pain. As he is feeling better is unclear what the etiology is, however he did report to me that he was concerned he might of taken too many of his normal blood pressure pills this morning. He did state that he took his normal meds which were in his pillbox but they seemed like they were more of them than normal. He states that the time he did not really think about it, but after starting to feel lightheaded outside he came back into recheck his pillbox. Of note in that group of medications was carvedilol and clonidine. These would account for his bradycardia and hypotension. His blood pressure did improve, it was in the low 100s to one teens. He feels better at this point. We discussed observation in the hospital setting, but he does have follow-up tomorrow with cardiology. His repeat troponin was downtrending. I have low suspicion for ACS as his symptoms have improved with IV fluids. We have agreed to informed discharge with close outpatient follow-up with cardiology tomorrow. In the meanwhile if he develops any new or worsening signs or symptoms he is instructed to return immediately for reevaluation.  He voiced understanding the discharge instructions and return precautions. Due to the immediate potential for life-threatening deterioration due to hypotension, I spent 45 minutes providing critical care. This time is excluding time spent performing procedures.       Clinical  IMPRESSION    Hypotension, accidental drug ingestion, acute renal insufficiency, detectable troponin, abnormal EKG          (Please note the MDM and HPI sections of this note were completed with a voice recognition program.  Efforts were made to edit the dictations but occasionally words are mis-transcribed.)      Sienna Gomez,   06/24/21 9930

## 2021-06-25 ENCOUNTER — OFFICE VISIT (OUTPATIENT)
Dept: CARDIOLOGY CLINIC | Age: 71
End: 2021-06-25
Payer: MEDICARE

## 2021-06-25 VITALS
HEART RATE: 60 BPM | BODY MASS INDEX: 37.15 KG/M2 | WEIGHT: 265.4 LBS | DIASTOLIC BLOOD PRESSURE: 58 MMHG | SYSTOLIC BLOOD PRESSURE: 112 MMHG | HEIGHT: 71 IN

## 2021-06-25 DIAGNOSIS — I25.10 ASCVD (ARTERIOSCLEROTIC CARDIOVASCULAR DISEASE): Primary | ICD-10-CM

## 2021-06-25 DIAGNOSIS — I10 ESSENTIAL HYPERTENSION: ICD-10-CM

## 2021-06-25 DIAGNOSIS — E78.2 MIXED HYPERLIPIDEMIA: ICD-10-CM

## 2021-06-25 PROCEDURE — G8417 CALC BMI ABV UP PARAM F/U: HCPCS | Performed by: NURSE PRACTITIONER

## 2021-06-25 PROCEDURE — 4040F PNEUMOC VAC/ADMIN/RCVD: CPT | Performed by: NURSE PRACTITIONER

## 2021-06-25 PROCEDURE — 93000 ELECTROCARDIOGRAM COMPLETE: CPT | Performed by: NURSE PRACTITIONER

## 2021-06-25 PROCEDURE — G8427 DOCREV CUR MEDS BY ELIG CLIN: HCPCS | Performed by: NURSE PRACTITIONER

## 2021-06-25 PROCEDURE — 1123F ACP DISCUSS/DSCN MKR DOCD: CPT | Performed by: NURSE PRACTITIONER

## 2021-06-25 PROCEDURE — 3017F COLORECTAL CA SCREEN DOC REV: CPT | Performed by: NURSE PRACTITIONER

## 2021-06-25 PROCEDURE — 99214 OFFICE O/P EST MOD 30 MIN: CPT | Performed by: NURSE PRACTITIONER

## 2021-06-25 PROCEDURE — 1036F TOBACCO NON-USER: CPT | Performed by: NURSE PRACTITIONER

## 2021-06-25 RX ORDER — CLOPIDOGREL BISULFATE 75 MG/1
75 TABLET ORAL DAILY
Qty: 90 TABLET | Refills: 3 | Status: SHIPPED | OUTPATIENT
Start: 2021-06-25

## 2021-06-25 RX ORDER — CLOPIDOGREL BISULFATE 75 MG/1
75 TABLET ORAL DAILY
Qty: 30 TABLET | Refills: 1 | Status: SHIPPED | OUTPATIENT
Start: 2021-06-25

## 2021-06-25 RX ORDER — CLOPIDOGREL BISULFATE 75 MG/1
75 TABLET ORAL DAILY
Qty: 90 TABLET | Refills: 3 | Status: SHIPPED | OUTPATIENT
Start: 2021-06-25 | End: 2021-06-25

## 2021-06-25 RX ORDER — CLOPIDOGREL BISULFATE 75 MG/1
75 TABLET ORAL DAILY
Qty: 90 TABLET | Refills: 1 | Status: SHIPPED | OUTPATIENT
Start: 2021-06-25 | End: 2021-06-25

## 2021-06-25 RX ORDER — CLOPIDOGREL BISULFATE 75 MG/1
75 TABLET ORAL DAILY
Qty: 90 TABLET | Refills: 1 | Status: CANCELLED | OUTPATIENT
Start: 2021-06-25

## 2021-06-25 NOTE — PATIENT INSTRUCTIONS
**It is YOUR responsibilty to bring medication bottles and/or updated medication list to [address] [place] [address]. This will allow us to better serve you and all your healthcare needs**      Please be informed that if you contact our office outside of normal business hours the physician on call cannot help with any scheduling or rescheduling issues, procedure instruction questions or any type of medication issue. We advise you for any urgent/emergency that you go to the nearest emergency room!     PLEASE CALL OUR OFFICE DURING NORMAL BUSINESS HOURS    Monday - Friday   8 am to 5 pm    [place]: K[place] [address]: [phone]    [place]:  [phone]

## 2021-06-25 NOTE — LETTER
[place]  [birth year]  [id number]    Have you had any Chest Pain that is not new? - No       Have you had any Shortness of Breath - Yes  If Yes - When at rest and on exertion    Have you had any dizziness - Yes, ongoing, occasional, unchanged  If Yes DO ORTHOSTATIC BP - when do you feel dizzy pt had been outside cutting grass   How long does it last .2.5   hours       Have you had any palpitations that are not new? - No       Do you have any edema - swelling in No    If Yes - CHECK TO SEE IF THE EDEMA IS PITTING    When did you have your last labs drawn 3/8/2021& 6/24/2021 in epic    If we do not have these labs you are retrieve these labs for these providers!     Do you have a surgery or procedure scheduled in the near future - No  If Yes- DO EKG

## 2021-06-25 NOTE — PROGRESS NOTES
round, and reactive to light. Cardiovascular:      Rate and Rhythm: Normal rate and regular rhythm. Pulses: Normal pulses. Pulmonary:      Effort: Pulmonary effort is normal.      Breath sounds: Normal breath sounds. Abdominal:      Palpations: Abdomen is soft. Musculoskeletal:      Right lower leg: No edema. Left lower leg: No edema. Skin:     General: Skin is dry. Capillary Refill: Capillary refill takes less than 2 seconds. Neurological:      Mental Status: He is alert and oriented to person, place, and time. All pertinent data reviewed and discussed with patient including:    Transthoracic echocardiogram : 02/2020  Technically difficult examination due to body habitus. Left ventricular systolic function is low normal.   Ejection fraction is visually estimated at 45-50%. Mild left ventricular hypertrophy. The non coronary cusp of the aortic valve appears calcified. No significant valvular disease noted. No evidence of any pericardial effusion. NM spect: 03/2021  Supervising physician Dr. Thomas Gomes .   Cyrena Bathe tracer uptake in all segments of myocardium on stress ans rest    images. Normal Lexiscan nuclear scintigraphic study suggestive of normal    myocardial perfusion. Gated images demonstrate normal left ventricular    systolic function with EF of 60 %. [facility]: 09/2020    1. Severe & critical 2 vessel CAD with multiple difficult high   risk lesions. LAD: 40 % hazy proximal, 70 to 80 % mid & multiple distal   lesions, most severe being95 %. RCA: 90 % proximal & 99 % distal irregular long area of multiple   stenosis. 2. Normal LV systolic function. LVEF is > 55 %. 3. Difficult but successful stenting of proximal & distal RCA   stenosis with TRUDI with excellent results. 4. Difficult but successful stenting of proximal & distal LAD   stenosis with TRUDI with excellent results. ASSESSMENT/PLAN:  1.  ASCVD (arteriosclerotic cardiovascular disease)  nstemi -complex multivessel PCI   Has ongoing shortness of breath since stent -may be secondary to Brilinta recommend to discontinue Brilinta add Plavix 75 mg 1 daily. In the ER yesterday noted to have elevated troponin and T wave inversion in the inferior leads. He also was noted to have JOSE with creatinine 1.8-baseline creatinine 1.3. -May be a type II leak secondary to hypotension and JOSE  Will decrease Aldactone to 12.5 mg x 4 days. Encouraged to keep self well-hydrated  Monitor blood pressure and heart rate at home and to phone office in 2 weeks with update  Continue with aspirin labetalol and benazepril  EKG today shows resolution of wave inversion  Stress test March 2021 normal perfusion normal EF    2. Essential hypertension  Blood pressure stable  Continue with benazepril      3. Mixed hyperlipidemia  Results for Maura Tilley (MRN [id number])    Ref. Range 3/8/2021 00:00   Cholesterol, Total Latest Units: mg/dL 114   HDL Cholesterol Latest Ref Range: 35 - 70 mg/dL 30 (A)   LDL Calculated Latest Ref Range: 0 - 160 mg/dL 41   Triglycerides Latest Units: mg/dL 364     Lipids reviewed with patient. Near goal HDL low 30  Continue with atorvastatin    Obesity  37.02 BMI  Weight loss encouraged    Shortness of breath  May be multifactorial secondary to obesity, Brilinta, de conditioning    Medications reviewed and confirmed with patient and wife    Tests ordered:  None     OV in 3 months       Signed:  MARILYN Bejarano CNP, 6/25/2021, 2:15 PM    An electronic signature was used to authenticate this note.

## 2021-07-02 ENCOUNTER — TELEPHONE (OUTPATIENT)
Dept: CARDIOLOGY CLINIC | Age: 71
End: 2021-07-02

## 2021-07-02 NOTE — TELEPHONE ENCOUNTER
Nenita said bp looks good. Wanted to know how shortness breath been going since stopping brilinta. It is about the same. I talked to nenita np and she said if the SOB keeps getting worst to go to the ED     I told the patent and they said okay.

## 2021-11-08 ENCOUNTER — TELEPHONE (OUTPATIENT)
Dept: CARDIOLOGY CLINIC | Age: 71
End: 2021-11-08

## 2021-11-11 NOTE — TELEPHONE ENCOUNTER
Clearance request cancelled with Dr Zurita Lav office.  Patient now sees Dr Kumar Reason, they will clear

## 2023-04-03 ENCOUNTER — HOSPITAL ENCOUNTER (INPATIENT)
Age: 73
LOS: 4 days | Discharge: HOME | End: 2023-04-07
Attending: STUDENT IN AN ORGANIZED HEALTH CARE EDUCATION/TRAINING PROGRAM | Admitting: STUDENT IN AN ORGANIZED HEALTH CARE EDUCATION/TRAINING PROGRAM
Payer: OTHER GOVERNMENT

## 2023-04-03 ENCOUNTER — APPOINTMENT (OUTPATIENT)
Dept: GENERAL RADIOLOGY | Age: 73
End: 2023-04-03
Payer: OTHER GOVERNMENT

## 2023-04-03 ENCOUNTER — HOSPITAL ENCOUNTER (EMERGENCY)
Age: 73
Discharge: ANOTHER HOSPITAL | End: 2023-04-03
Attending: EMERGENCY MEDICINE
Payer: OTHER GOVERNMENT

## 2023-04-03 VITALS
HEART RATE: 61 BPM | BODY MASS INDEX: 37.66 KG/M2 | SYSTOLIC BLOOD PRESSURE: 115 MMHG | OXYGEN SATURATION: 97 % | WEIGHT: 270 LBS | TEMPERATURE: 98.8 F | DIASTOLIC BLOOD PRESSURE: 69 MMHG | RESPIRATION RATE: 12 BRPM

## 2023-04-03 DIAGNOSIS — I20.0 UNSTABLE ANGINA (HCC): Primary | ICD-10-CM

## 2023-04-03 LAB
ANION GAP SERPL CALCULATED.3IONS-SCNC: 15 MMOL/L (ref 4–16)
BASOPHILS ABSOLUTE: 0 K/CU MM
BASOPHILS RELATIVE PERCENT: 0.6 % (ref 0–1)
BUN SERPL-MCNC: 42 MG/DL (ref 6–23)
CALCIUM SERPL-MCNC: 9.8 MG/DL (ref 8.3–10.6)
CHLORIDE BLD-SCNC: 98 MMOL/L (ref 99–110)
CO2: 22 MMOL/L (ref 21–32)
CREAT SERPL-MCNC: 1.6 MG/DL (ref 0.9–1.3)
DIFFERENTIAL TYPE: ABNORMAL
EKG ATRIAL RATE: 97 BPM
EKG DIAGNOSIS: NORMAL
EKG P AXIS: 39 DEGREES
EKG P-R INTERVAL: 180 MS
EKG Q-T INTERVAL: 326 MS
EKG QRS DURATION: 84 MS
EKG QTC CALCULATION (BAZETT): 414 MS
EKG R AXIS: -26 DEGREES
EKG T AXIS: 42 DEGREES
EKG VENTRICULAR RATE: 97 BPM
EOSINOPHILS ABSOLUTE: 0.2 K/CU MM
EOSINOPHILS RELATIVE PERCENT: 4.2 % (ref 0–3)
GFR SERPL CREATININE-BSD FRML MDRD: 45 ML/MIN/1.73M2
GLUCOSE SERPL-MCNC: 234 MG/DL (ref 70–99)
HCT VFR BLD CALC: 45 % (ref 42–52)
HEMOGLOBIN: 15.4 GM/DL (ref 13.5–18)
IMMATURE NEUTROPHIL %: 0.8 % (ref 0–0.43)
LYMPHOCYTES ABSOLUTE: 1.8 K/CU MM
LYMPHOCYTES RELATIVE PERCENT: 35 % (ref 24–44)
MCH RBC QN AUTO: 31.7 PG (ref 27–31)
MCHC RBC AUTO-ENTMCNC: 34.2 % (ref 32–36)
MCV RBC AUTO: 92.6 FL (ref 78–100)
MONOCYTES ABSOLUTE: 0.8 K/CU MM
MONOCYTES RELATIVE PERCENT: 15.4 % (ref 0–4)
PDW BLD-RTO: 13.2 % (ref 11.7–14.9)
PLATELET # BLD: 205 K/CU MM (ref 140–440)
PMV BLD AUTO: 10.7 FL (ref 7.5–11.1)
POTASSIUM SERPL-SCNC: 4.4 MMOL/L (ref 3.5–5.1)
RBC # BLD: 4.86 M/CU MM (ref 4.6–6.2)
SEGMENTED NEUTROPHILS ABSOLUTE COUNT: 2.2 K/CU MM
SEGMENTED NEUTROPHILS RELATIVE PERCENT: 44 % (ref 36–66)
SODIUM BLD-SCNC: 135 MMOL/L (ref 135–145)
TOTAL IMMATURE NEUTOROPHIL: 0.04 K/CU MM
TROPONIN T: 0.04 NG/ML
WBC # BLD: 5.1 K/CU MM (ref 4–10.5)

## 2023-04-03 PROCEDURE — 2580000003 HC RX 258: Performed by: EMERGENCY MEDICINE

## 2023-04-03 PROCEDURE — 93010 ELECTROCARDIOGRAM REPORT: CPT | Performed by: INTERNAL MEDICINE

## 2023-04-03 PROCEDURE — 93005 ELECTROCARDIOGRAM TRACING: CPT | Performed by: EMERGENCY MEDICINE

## 2023-04-03 PROCEDURE — 96372 THER/PROPH/DIAG INJ SC/IM: CPT

## 2023-04-03 PROCEDURE — 99285 EMERGENCY DEPT VISIT HI MDM: CPT

## 2023-04-03 PROCEDURE — 80048 BASIC METABOLIC PNL TOTAL CA: CPT

## 2023-04-03 PROCEDURE — 6360000002 HC RX W HCPCS: Performed by: EMERGENCY MEDICINE

## 2023-04-03 PROCEDURE — 84484 ASSAY OF TROPONIN QUANT: CPT

## 2023-04-03 PROCEDURE — 71045 X-RAY EXAM CHEST 1 VIEW: CPT

## 2023-04-03 PROCEDURE — 85025 COMPLETE CBC W/AUTO DIFF WBC: CPT

## 2023-04-03 PROCEDURE — 2060000000 HC ICU INTERMEDIATE R&B

## 2023-04-03 RX ORDER — ENOXAPARIN SODIUM 150 MG/ML
1 INJECTION SUBCUTANEOUS ONCE
Status: COMPLETED | OUTPATIENT
Start: 2023-04-03 | End: 2023-04-03

## 2023-04-03 RX ORDER — 0.9 % SODIUM CHLORIDE 0.9 %
1000 INTRAVENOUS SOLUTION INTRAVENOUS ONCE
Status: COMPLETED | OUTPATIENT
Start: 2023-04-03 | End: 2023-04-03

## 2023-04-03 RX ADMIN — ENOXAPARIN SODIUM 120 MG: 150 INJECTION SUBCUTANEOUS at 13:29

## 2023-04-03 RX ADMIN — SODIUM CHLORIDE 1000 ML: 9 INJECTION, SOLUTION INTRAVENOUS at 16:44

## 2023-04-03 ASSESSMENT — ENCOUNTER SYMPTOMS: SHORTNESS OF BREATH: 1

## 2023-04-03 ASSESSMENT — PAIN SCALES - GENERAL: PAINLEVEL_OUTOF10: 2

## 2023-04-03 ASSESSMENT — PAIN - FUNCTIONAL ASSESSMENT: PAIN_FUNCTIONAL_ASSESSMENT: 0-10

## 2023-04-03 NOTE — ED PROVIDER NOTES
given the following medications:  Medications   0.9 % sodium chloride bolus (1,000 mLs IntraVENous New Bag 4/3/23 1644)   enoxaparin (LOVENOX) injection 120 mg (120 mg SubCUTAneous Given 4/3/23 1329)       Imaging Interpretation by CXR neg  by me      Chronic conditions affecting care: CAD    Discussion with Other Profesionals : Admitting Team      Social Determinants : None    Records Reviewed : Source EPIC    Disposition Considerations: Transfer         I am the Primary Clinician of Record. Is this patient to be included in the SEP-1 Core Measure due to severe sepsis or septic shock? No   Exclusion criteria - the patient is NOT to be included for SEP-1 Core Measure due to: Infection is not suspected       Clinical Impression:  1. Unstable angina (HCC)      Disposition referral (if applicable):  No follow-up provider specified. Disposition medications (if applicable):  New Prescriptions    No medications on file           Chang Diggs DO, FACEP      Comment: Please note this report has been produced using speech recognition software and maycontain errors related to that system including errors in grammar, punctuation, and spelling, as well as words and phrases that may be inappropriate. If there are any questions or concerns please feel free to contact thedictating provider for clarification.        Criselda Burk, DO  04/03/23 528 Jacques Brian,   04/03/23 5351

## 2023-04-04 PROBLEM — R07.9 CHEST PAIN: Status: ACTIVE | Noted: 2023-04-04

## 2023-04-04 PROBLEM — I20.0 UNSTABLE ANGINA (HCC): Status: ACTIVE | Noted: 2023-04-04

## 2023-04-04 LAB
ALBUMIN SERPL-MCNC: 4.4 GM/DL (ref 3.4–5)
ALP BLD-CCNC: 66 IU/L (ref 40–128)
ALT SERPL-CCNC: 28 U/L (ref 10–40)
ANION GAP SERPL CALCULATED.3IONS-SCNC: 13 MMOL/L (ref 4–16)
AST SERPL-CCNC: 24 IU/L (ref 15–37)
BASOPHILS ABSOLUTE: 0 K/CU MM
BASOPHILS RELATIVE PERCENT: 0.6 % (ref 0–1)
BILIRUB SERPL-MCNC: 0.4 MG/DL (ref 0–1)
BILIRUBIN URINE: NEGATIVE MG/DL
BLOOD, URINE: NEGATIVE
BUN SERPL-MCNC: 38 MG/DL (ref 6–23)
CALCIUM SERPL-MCNC: 9.3 MG/DL (ref 8.3–10.6)
CHLORIDE BLD-SCNC: 101 MMOL/L (ref 99–110)
CHOLEST SERPL-MCNC: 125 MG/DL
CLARITY: CLEAR
CO2: 22 MMOL/L (ref 21–32)
COLOR: YELLOW
COMMENT UA: NORMAL
CREAT SERPL-MCNC: 1.4 MG/DL (ref 0.9–1.3)
CREAT UR-MCNC: 85 MG/DL (ref 39–259)
DIFFERENTIAL TYPE: ABNORMAL
EKG ATRIAL RATE: 55 BPM
EKG ATRIAL RATE: 60 BPM
EKG DIAGNOSIS: NORMAL
EKG DIAGNOSIS: NORMAL
EKG P AXIS: 107 DEGREES
EKG P AXIS: 79 DEGREES
EKG P-R INTERVAL: 190 MS
EKG P-R INTERVAL: 192 MS
EKG Q-T INTERVAL: 398 MS
EKG Q-T INTERVAL: 414 MS
EKG QRS DURATION: 110 MS
EKG QRS DURATION: 118 MS
EKG QTC CALCULATION (BAZETT): 396 MS
EKG QTC CALCULATION (BAZETT): 398 MS
EKG R AXIS: -29 DEGREES
EKG R AXIS: 222 DEGREES
EKG T AXIS: 142 DEGREES
EKG T AXIS: 19 DEGREES
EKG VENTRICULAR RATE: 55 BPM
EKG VENTRICULAR RATE: 60 BPM
EOSINOPHILS ABSOLUTE: 0.1 K/CU MM
EOSINOPHILS RELATIVE PERCENT: 2.4 % (ref 0–3)
ESTIMATED AVERAGE GLUCOSE: 192 MG/DL
GFR SERPL CREATININE-BSD FRML MDRD: 53 ML/MIN/1.73M2
GLUCOSE BLD-MCNC: 106 MG/DL (ref 70–99)
GLUCOSE BLD-MCNC: 113 MG/DL (ref 70–99)
GLUCOSE BLD-MCNC: 169 MG/DL (ref 70–99)
GLUCOSE BLD-MCNC: 242 MG/DL (ref 70–99)
GLUCOSE SERPL-MCNC: 110 MG/DL (ref 70–99)
GLUCOSE, URINE: NEGATIVE MG/DL
HBA1C MFR BLD: 8.3 % (ref 4.2–6.3)
HCT VFR BLD CALC: 41.1 % (ref 42–52)
HDLC SERPL-MCNC: 24 MG/DL
HEMOGLOBIN: 14.1 GM/DL (ref 13.5–18)
IMMATURE NEUTROPHIL %: 0.6 % (ref 0–0.43)
KETONES, URINE: NEGATIVE MG/DL
LDLC SERPL CALC-MCNC: 54 MG/DL
LEUKOCYTE ESTERASE, URINE: NEGATIVE
LV EF: 50 %
LV EF: 50 %
LVEF MODALITY: NORMAL
LVEF MODALITY: NORMAL
LYMPHOCYTES ABSOLUTE: 1.7 K/CU MM
LYMPHOCYTES RELATIVE PERCENT: 37.6 % (ref 24–44)
MAGNESIUM: 1.7 MG/DL (ref 1.8–2.4)
MCH RBC QN AUTO: 31.4 PG (ref 27–31)
MCHC RBC AUTO-ENTMCNC: 34.3 % (ref 32–36)
MCV RBC AUTO: 91.5 FL (ref 78–100)
MONOCYTES ABSOLUTE: 0.7 K/CU MM
MONOCYTES RELATIVE PERCENT: 15.6 % (ref 0–4)
NITRITE URINE, QUANTITATIVE: NEGATIVE
NUCLEATED RBC %: 0 %
PDW BLD-RTO: 13.2 % (ref 11.7–14.9)
PH, URINE: 5.5 (ref 5–8)
PLATELET # BLD: 175 K/CU MM (ref 140–440)
PMV BLD AUTO: 10.1 FL (ref 7.5–11.1)
POTASSIUM SERPL-SCNC: 4 MMOL/L (ref 3.5–5.1)
PRO-BNP: 19.37 PG/ML
PROTEIN UA: NEGATIVE MG/DL
RBC # BLD: 4.49 M/CU MM (ref 4.6–6.2)
SEGMENTED NEUTROPHILS ABSOLUTE COUNT: 2 K/CU MM
SEGMENTED NEUTROPHILS RELATIVE PERCENT: 43.2 % (ref 36–66)
SODIUM BLD-SCNC: 136 MMOL/L (ref 135–145)
SODIUM URINE: 68 MMOL/L (ref 35–167)
SPECIFIC GRAVITY UA: 1.01 (ref 1–1.03)
TOTAL IMMATURE NEUTOROPHIL: 0.03 K/CU MM
TOTAL NUCLEATED RBC: 0 K/CU MM
TOTAL PROTEIN: 6.8 GM/DL (ref 6.4–8.2)
TRIGL SERPL-MCNC: 236 MG/DL
TROPONIN T: 0.04 NG/ML
TROPONIN T: 0.04 NG/ML
UROBILINOGEN, URINE: 0.2 MG/DL (ref 0.2–1)
WBC # BLD: 4.6 K/CU MM (ref 4–10.5)

## 2023-04-04 PROCEDURE — 2580000003 HC RX 258: Performed by: STUDENT IN AN ORGANIZED HEALTH CARE EDUCATION/TRAINING PROGRAM

## 2023-04-04 PROCEDURE — 93325 DOPPLER ECHO COLOR FLOW MAPG: CPT | Performed by: INTERNAL MEDICINE

## 2023-04-04 PROCEDURE — 93005 ELECTROCARDIOGRAM TRACING: CPT | Performed by: STUDENT IN AN ORGANIZED HEALTH CARE EDUCATION/TRAINING PROGRAM

## 2023-04-04 PROCEDURE — 93010 ELECTROCARDIOGRAM REPORT: CPT | Performed by: INTERNAL MEDICINE

## 2023-04-04 PROCEDURE — 93306 TTE W/DOPPLER COMPLETE: CPT

## 2023-04-04 PROCEDURE — 6370000000 HC RX 637 (ALT 250 FOR IP): Performed by: STUDENT IN AN ORGANIZED HEALTH CARE EDUCATION/TRAINING PROGRAM

## 2023-04-04 PROCEDURE — 83880 ASSAY OF NATRIURETIC PEPTIDE: CPT

## 2023-04-04 PROCEDURE — 82570 ASSAY OF URINE CREATININE: CPT

## 2023-04-04 PROCEDURE — 84300 ASSAY OF URINE SODIUM: CPT

## 2023-04-04 PROCEDURE — 93312 ECHO TRANSESOPHAGEAL: CPT | Performed by: INTERNAL MEDICINE

## 2023-04-04 PROCEDURE — 6360000002 HC RX W HCPCS: Performed by: STUDENT IN AN ORGANIZED HEALTH CARE EDUCATION/TRAINING PROGRAM

## 2023-04-04 PROCEDURE — 6370000000 HC RX 637 (ALT 250 FOR IP): Performed by: INTERNAL MEDICINE

## 2023-04-04 PROCEDURE — 87040 BLOOD CULTURE FOR BACTERIA: CPT

## 2023-04-04 PROCEDURE — 80061 LIPID PANEL: CPT

## 2023-04-04 PROCEDURE — 80053 COMPREHEN METABOLIC PANEL: CPT

## 2023-04-04 PROCEDURE — 93312 ECHO TRANSESOPHAGEAL: CPT

## 2023-04-04 PROCEDURE — 82962 GLUCOSE BLOOD TEST: CPT

## 2023-04-04 PROCEDURE — 2060000000 HC ICU INTERMEDIATE R&B

## 2023-04-04 PROCEDURE — 36415 COLL VENOUS BLD VENIPUNCTURE: CPT

## 2023-04-04 PROCEDURE — 83735 ASSAY OF MAGNESIUM: CPT

## 2023-04-04 PROCEDURE — 85025 COMPLETE CBC W/AUTO DIFF WBC: CPT

## 2023-04-04 PROCEDURE — 84484 ASSAY OF TROPONIN QUANT: CPT

## 2023-04-04 PROCEDURE — 94761 N-INVAS EAR/PLS OXIMETRY MLT: CPT

## 2023-04-04 PROCEDURE — 7100000000 HC PACU RECOVERY - FIRST 15 MIN

## 2023-04-04 PROCEDURE — 99223 1ST HOSP IP/OBS HIGH 75: CPT | Performed by: INTERNAL MEDICINE

## 2023-04-04 PROCEDURE — 83036 HEMOGLOBIN GLYCOSYLATED A1C: CPT

## 2023-04-04 PROCEDURE — 81003 URINALYSIS AUTO W/O SCOPE: CPT

## 2023-04-04 PROCEDURE — 7100000001 HC PACU RECOVERY - ADDTL 15 MIN

## 2023-04-04 RX ORDER — ACETAMINOPHEN 650 MG/1
650 SUPPOSITORY RECTAL EVERY 6 HOURS PRN
Status: DISCONTINUED | OUTPATIENT
Start: 2023-04-04 | End: 2023-04-07 | Stop reason: HOSPADM

## 2023-04-04 RX ORDER — SPIRONOLACTONE 50 MG/1
25 TABLET, FILM COATED ORAL DAILY
Status: DISCONTINUED | OUTPATIENT
Start: 2023-04-04 | End: 2023-04-07 | Stop reason: HOSPADM

## 2023-04-04 RX ORDER — INSULIN LISPRO 100 [IU]/ML
0-4 INJECTION, SOLUTION INTRAVENOUS; SUBCUTANEOUS NIGHTLY
Status: DISCONTINUED | OUTPATIENT
Start: 2023-04-04 | End: 2023-04-07 | Stop reason: HOSPADM

## 2023-04-04 RX ORDER — SODIUM CHLORIDE 0.9 % (FLUSH) 0.9 %
5-40 SYRINGE (ML) INJECTION EVERY 12 HOURS SCHEDULED
Status: DISCONTINUED | OUTPATIENT
Start: 2023-04-04 | End: 2023-04-07 | Stop reason: HOSPADM

## 2023-04-04 RX ORDER — SODIUM CHLORIDE 0.9 % (FLUSH) 0.9 %
5-40 SYRINGE (ML) INJECTION PRN
Status: DISCONTINUED | OUTPATIENT
Start: 2023-04-04 | End: 2023-04-07 | Stop reason: HOSPADM

## 2023-04-04 RX ORDER — INSULIN GLARGINE 100 [IU]/ML
25 INJECTION, SOLUTION SUBCUTANEOUS 2 TIMES DAILY
Status: DISCONTINUED | OUTPATIENT
Start: 2023-04-04 | End: 2023-04-07

## 2023-04-04 RX ORDER — CHLORTHALIDONE 25 MG/1
25 TABLET ORAL DAILY
Status: DISCONTINUED | OUTPATIENT
Start: 2023-04-04 | End: 2023-04-07 | Stop reason: HOSPADM

## 2023-04-04 RX ORDER — LISINOPRIL 20 MG/1
40 TABLET ORAL DAILY
Status: DISCONTINUED | OUTPATIENT
Start: 2023-04-04 | End: 2023-04-07 | Stop reason: HOSPADM

## 2023-04-04 RX ORDER — ENOXAPARIN SODIUM 100 MG/ML
30 INJECTION SUBCUTANEOUS 2 TIMES DAILY
Status: DISCONTINUED | OUTPATIENT
Start: 2023-04-04 | End: 2023-04-04

## 2023-04-04 RX ORDER — CLONIDINE HYDROCHLORIDE 0.1 MG/1
0.2 TABLET ORAL 2 TIMES DAILY
Status: DISCONTINUED | OUTPATIENT
Start: 2023-04-04 | End: 2023-04-07 | Stop reason: HOSPADM

## 2023-04-04 RX ORDER — GLUCAGON 1 MG/ML
1 KIT INJECTION PRN
Status: DISCONTINUED | OUTPATIENT
Start: 2023-04-04 | End: 2023-04-07 | Stop reason: HOSPADM

## 2023-04-04 RX ORDER — ONDANSETRON 2 MG/ML
4 INJECTION INTRAMUSCULAR; INTRAVENOUS EVERY 6 HOURS PRN
Status: DISCONTINUED | OUTPATIENT
Start: 2023-04-04 | End: 2023-04-07 | Stop reason: HOSPADM

## 2023-04-04 RX ORDER — ONDANSETRON 4 MG/1
4 TABLET, ORALLY DISINTEGRATING ORAL EVERY 8 HOURS PRN
Status: DISCONTINUED | OUTPATIENT
Start: 2023-04-04 | End: 2023-04-07 | Stop reason: HOSPADM

## 2023-04-04 RX ORDER — ATORVASTATIN CALCIUM 10 MG/1
20 TABLET, FILM COATED ORAL DAILY
Status: DISCONTINUED | OUTPATIENT
Start: 2023-04-04 | End: 2023-04-07 | Stop reason: HOSPADM

## 2023-04-04 RX ORDER — SODIUM CHLORIDE 9 MG/ML
INJECTION, SOLUTION INTRAVENOUS PRN
Status: DISCONTINUED | OUTPATIENT
Start: 2023-04-04 | End: 2023-04-07 | Stop reason: HOSPADM

## 2023-04-04 RX ORDER — ENOXAPARIN SODIUM 100 MG/ML
40 INJECTION SUBCUTANEOUS DAILY
Status: DISCONTINUED | OUTPATIENT
Start: 2023-04-04 | End: 2023-04-04

## 2023-04-04 RX ORDER — MAGNESIUM SULFATE IN WATER 40 MG/ML
2000 INJECTION, SOLUTION INTRAVENOUS ONCE
Status: COMPLETED | OUTPATIENT
Start: 2023-04-04 | End: 2023-04-04

## 2023-04-04 RX ORDER — OXYBUTYNIN CHLORIDE 5 MG/1
5 TABLET ORAL 3 TIMES DAILY
Status: DISCONTINUED | OUTPATIENT
Start: 2023-04-04 | End: 2023-04-07 | Stop reason: HOSPADM

## 2023-04-04 RX ORDER — ACETAMINOPHEN 325 MG/1
650 TABLET ORAL EVERY 6 HOURS PRN
Status: DISCONTINUED | OUTPATIENT
Start: 2023-04-04 | End: 2023-04-07 | Stop reason: HOSPADM

## 2023-04-04 RX ORDER — POLYETHYLENE GLYCOL 3350 17 G/17G
17 POWDER, FOR SOLUTION ORAL DAILY PRN
Status: DISCONTINUED | OUTPATIENT
Start: 2023-04-04 | End: 2023-04-07 | Stop reason: HOSPADM

## 2023-04-04 RX ORDER — DEXTROSE MONOHYDRATE 100 MG/ML
INJECTION, SOLUTION INTRAVENOUS CONTINUOUS PRN
Status: DISCONTINUED | OUTPATIENT
Start: 2023-04-04 | End: 2023-04-07 | Stop reason: HOSPADM

## 2023-04-04 RX ORDER — ENOXAPARIN SODIUM 150 MG/ML
1 INJECTION SUBCUTANEOUS 2 TIMES DAILY
Status: DISCONTINUED | OUTPATIENT
Start: 2023-04-04 | End: 2023-04-07

## 2023-04-04 RX ORDER — ASPIRIN 81 MG/1
81 TABLET, CHEWABLE ORAL DAILY
Status: DISCONTINUED | OUTPATIENT
Start: 2023-04-04 | End: 2023-04-07 | Stop reason: HOSPADM

## 2023-04-04 RX ORDER — SODIUM CHLORIDE 9 MG/ML
INJECTION, SOLUTION INTRAVENOUS CONTINUOUS
Status: DISPENSED | OUTPATIENT
Start: 2023-04-04 | End: 2023-04-04

## 2023-04-04 RX ORDER — INSULIN LISPRO 100 [IU]/ML
0-8 INJECTION, SOLUTION INTRAVENOUS; SUBCUTANEOUS
Status: DISCONTINUED | OUTPATIENT
Start: 2023-04-04 | End: 2023-04-07 | Stop reason: HOSPADM

## 2023-04-04 RX ADMIN — MAGNESIUM SULFATE HEPTAHYDRATE 2000 MG: 40 INJECTION, SOLUTION INTRAVENOUS at 04:34

## 2023-04-04 RX ADMIN — SODIUM CHLORIDE: 9 INJECTION, SOLUTION INTRAVENOUS at 04:31

## 2023-04-04 RX ADMIN — CLONIDINE HYDROCHLORIDE 0.2 MG: 0.1 TABLET ORAL at 08:51

## 2023-04-04 RX ADMIN — SODIUM CHLORIDE, PRESERVATIVE FREE 10 ML: 5 INJECTION INTRAVENOUS at 20:50

## 2023-04-04 RX ADMIN — OXYBUTYNIN CHLORIDE 5 MG: 5 TABLET ORAL at 08:51

## 2023-04-04 RX ADMIN — OXYBUTYNIN CHLORIDE 5 MG: 5 TABLET ORAL at 20:49

## 2023-04-04 RX ADMIN — ENOXAPARIN SODIUM 120 MG: 150 INJECTION SUBCUTANEOUS at 20:50

## 2023-04-04 RX ADMIN — INSULIN GLARGINE 25 UNITS: 100 INJECTION, SOLUTION SUBCUTANEOUS at 20:50

## 2023-04-04 RX ADMIN — METOPROLOL TARTRATE 25 MG: 25 TABLET, FILM COATED ORAL at 20:49

## 2023-04-04 RX ADMIN — CLONIDINE HYDROCHLORIDE 0.2 MG: 0.1 TABLET ORAL at 20:49

## 2023-04-04 RX ADMIN — ASPIRIN 81 MG 81 MG: 81 TABLET ORAL at 08:51

## 2023-04-04 RX ADMIN — ATORVASTATIN CALCIUM 20 MG: 10 TABLET, FILM COATED ORAL at 08:50

## 2023-04-04 RX ADMIN — ACETAMINOPHEN 650 MG: 325 TABLET ORAL at 02:03

## 2023-04-04 RX ADMIN — OXYBUTYNIN CHLORIDE 5 MG: 5 TABLET ORAL at 15:44

## 2023-04-04 ASSESSMENT — PAIN DESCRIPTION - LOCATION: LOCATION: HEAD

## 2023-04-04 ASSESSMENT — PAIN SCALES - GENERAL: PAINLEVEL_OUTOF10: 3

## 2023-04-04 NOTE — H&P
6/25/2021    Historical Provider, MD   finasteride (PROSCAR) 5 MG tablet Take 5 mg by mouth daily  Patient not taking: Reported on 4/4/2023    Historical Provider, MD   loratadine (CLARITIN) 10 MG capsule Take 10 mg by mouth daily  Patient not taking: Reported on 4/4/2023    Historical Provider, MD   glipiZIDE (GLUCOTROL) 5 MG tablet Take 2 tablets by mouth 2 times daily 9/1/20   Historical Provider, MD   metFORMIN (GLUCOPHAGE) 500 MG tablet Take 1 tablet by mouth 2 times daily (with meals)    Historical Provider, MD   cloNIDine (CATAPRES) 0.2 MG tablet 1 tablet 2 times daily 3/11/20   Historical Provider, MD   nitroGLYCERIN (NITROSTAT) 0.4 MG SL tablet up to max of 3 total doses. If no relief after 1 dose, call 911. Patient not taking: Reported on 6/25/2021 9/30/20   Catie Masters MD   aspirin 81 MG chewable tablet Take 1 tablet by mouth daily 9/30/20   Catie Masters MD   insulin glargine (LANTUS) 100 UNIT/ML injection vial Inject 33 Units into the skin 2 times daily    Historical Provider, MD   alogliptin (NESINA) 25 MG TABS tablet Take 12.5 mg by mouth daily     Historical Provider, MD   benazepril (LOTENSIN) 40 MG tablet Take 1 tablet by mouth daily 3/30/17   Mary Beltran MD   oxybutynin (DITROPAN) 5 MG tablet Take 1 tablet by mouth 3 times daily 3/30/17   Mary Beltran MD   atorvastatin (LIPITOR) 20 MG tablet Take 1 tablet by mouth daily 12/20/16   Mary Beltran MD   chlorthalidone (HYGROTON) 25 MG tablet Take 1 tablet by mouth daily    Historical Provider, MD   spironolactone (ALDACTONE) 25 MG tablet Take 1 tablet by mouth daily 4/28/16   MARILYN Osborne CNP   Blood Glucose Monitoring Suppl [facility] BLOOD GLUCOSE METER) W/DEVICE KIT 1 kit by Does not apply route once for 1 dose 4/28/16 3/22/21  MARILYN Osborne CNP       Physical Exam: Need 8 Elements   Physical Exam     General: NAD  Eyes: EOMI  ENT: neck supple  Cardiovascular: Regular rate.   Respiratory: Clear to

## 2023-04-04 NOTE — CONSULTS
chewable tablet 16 g  4 tablet Oral PRN Jose Rincon MD        dextrose bolus 10% 125 mL  125 mL IntraVENous PRN Jose Rincon MD        Or    dextrose bolus 10% 250 mL  250 mL IntraVENous PRN Jose Rincon MD        glucagon injection 1 mg  1 mg SubCUTAneous PRN Jose Rincon MD        dextrose 10 % infusion   IntraVENous Continuous PRN Jose Rincon MD        aspirin chewable tablet 81 mg  81 mg Oral Daily Jose Rincon MD   81 mg at 04/04/23 0851    atorvastatin (LIPITOR) tablet 20 mg  20 mg Oral Daily Jose Rincon MD   20 mg at 04/04/23 0850    cloNIDine (CATAPRES) tablet 0.2 mg  0.2 mg Oral BID Jose Rincon MD   0.2 mg at 04/04/23 0851    [Held by provider] lisinopril (PRINIVIL;ZESTRIL) tablet 40 mg  40 mg Oral Daily Jose Rincon MD        oxybutynin (DITROPAN) tablet 5 mg  5 mg Oral TID Jose Rincon MD   5 mg at 04/04/23 0851    [Held by provider] spironolactone (ALDACTONE) tablet 25 mg  25 mg Oral Daily Jose Rincon MD        [Held by provider] chlorthalidone (HYGROTON) tablet 25 mg  25 mg Oral Daily Jose Rincon MD        sodium chloride flush 0.9 % injection 5-40 mL  5-40 mL IntraVENous 2 times per day Jose Rincon MD        sodium chloride flush 0.9 % injection 5-40 mL  5-40 mL IntraVENous PRN Jose Rincon MD        0.9 % sodium chloride infusion   IntraVENous PRN Jose Rincon MD        ondansetron (ZOFRAN-ODT) disintegrating tablet 4 mg  4 mg Oral Q8H PRN Jose Rincon MD        Or    ondansetron (ZOFRAN) injection 4 mg  4 mg IntraVENous Q6H PRN Jose Rincon MD        polyethylene glycol (GLYCOLAX) packet 17 g  17 g Oral Daily PRN Jose Rincon MD        acetaminophen (TYLENOL) tablet 650 mg  650 mg Oral Q6H PRN Jose Rincon MD   650 mg at 04/04/23 0203    Or    acetaminophen (TYLENOL) suppository 650 mg  650 mg Rectal Q6H PRN Jose Rincon MD        0.9 % sodium chloride infusion   IntraVENous Continuous Jose Rincon MD 75 mL/hr at 04/04/23 0431 New Bag at 04/04/23 0431    enoxaparin (LOVENOX) injection 120 mg  1 mg/kg SubCUTAneous

## 2023-04-04 NOTE — CARE COORDINATION
expects to discharge to: [facility] for transportation at discharge:      Financial    Payor: Ricardo Galarza / Plan: Ricardo Michell / Product Type: *No Product type* /     Does insurance require precert for SNF: Yes    Potential assistance Purchasing Medications:    Meds-to-Beds request: No      [facility] Naomie Haider [place] [address] [phone] [facility] [phone]  [address] [address]  Phone: [phone] Fax: [phone]    [facility] - [place], [address] [phone] [facility] [phone]  [address] [place] [address]  Phone: [phone] Fax: [phone]      Notes:    Factors facilitating achievement of predicted outcomes: Family support, Cooperative, Pleasant, Good insight into deficits, and Independent. Barriers to discharge: None    Additional Case Management Notes:   Pt is from home with his wife. He drives, has a dependable vehicle and is independent with ADL's  and housekeeping tasks. He uses a cane, has a shower chair & grab bars. Lives in a one story home with a level entry. Plan home, no needs. The Plan for Transition of Care is related to the following treatment goals of Chest pain [R07.9]  Unstable angina ([address] [Q19.1]    IF APPLICABLE: The Patient and/or patient representative Zoraida Gutierrez and his family were provided with a choice of provider and agrees with the discharge plan. Freedom of choice list with basic dialogue that supports the patient's individualized plan of care/goals and shares the quality data associated with the providers was provided to:     Patient Representative Name:       The Patient and/or Patient Representative Agree with the Discharge Plan?       Mable Boas, RN  Case Management Department  Ph: [phone]  Fax: [phone]

## 2023-04-05 ENCOUNTER — APPOINTMENT (OUTPATIENT)
Dept: GENERAL RADIOLOGY | Age: 73
End: 2023-04-05
Attending: STUDENT IN AN ORGANIZED HEALTH CARE EDUCATION/TRAINING PROGRAM
Payer: OTHER GOVERNMENT

## 2023-04-05 ENCOUNTER — HOSPITAL ENCOUNTER (OUTPATIENT)
Dept: CARDIAC CATH/INVASIVE PROCEDURES | Age: 73
Discharge: HOME | End: 2023-04-05

## 2023-04-05 LAB
ALBUMIN SERPL-MCNC: 4.6 GM/DL (ref 3.4–5)
ALP BLD-CCNC: 69 IU/L (ref 40–128)
ALT SERPL-CCNC: 30 U/L (ref 10–40)
ANION GAP SERPL CALCULATED.3IONS-SCNC: 13 MMOL/L (ref 4–16)
AST SERPL-CCNC: 37 IU/L (ref 15–37)
B PARAP IS1001 DNA NPH QL NAA+NON-PROBE: NOT DETECTED
B PERT.PT PRMT NPH QL NAA+NON-PROBE: NOT DETECTED
BACTERIA: NEGATIVE /HPF
BASOPHILS ABSOLUTE: 0 K/CU MM
BASOPHILS RELATIVE PERCENT: 0.1 % (ref 0–1)
BILIRUB SERPL-MCNC: 0.5 MG/DL (ref 0–1)
BILIRUBIN URINE: NEGATIVE MG/DL
BLOOD, URINE: ABNORMAL
BUN SERPL-MCNC: 35 MG/DL (ref 6–23)
C PNEUM DNA NPH QL NAA+NON-PROBE: NOT DETECTED
CALCIUM SERPL-MCNC: 9.1 MG/DL (ref 8.3–10.6)
CHLORIDE BLD-SCNC: 101 MMOL/L (ref 99–110)
CLARITY: CLEAR
CO2: 21 MMOL/L (ref 21–32)
COLOR: YELLOW
CREAT SERPL-MCNC: 1.4 MG/DL (ref 0.9–1.3)
DIFFERENTIAL TYPE: ABNORMAL
EKG ATRIAL RATE: 106 BPM
EKG DIAGNOSIS: NORMAL
EKG P AXIS: 64 DEGREES
EKG P-R INTERVAL: 188 MS
EKG Q-T INTERVAL: 322 MS
EKG QRS DURATION: 102 MS
EKG QTC CALCULATION (BAZETT): 427 MS
EKG R AXIS: -42 DEGREES
EKG T AXIS: 54 DEGREES
EKG VENTRICULAR RATE: 106 BPM
EOSINOPHILS ABSOLUTE: 0 K/CU MM
EOSINOPHILS RELATIVE PERCENT: 0.2 % (ref 0–3)
FLUAV H1 2009 PAN RNA NPH NAA+NON-PROBE: NOT DETECTED
FLUAV H1 RNA NPH QL NAA+NON-PROBE: NOT DETECTED
FLUAV H3 RNA NPH QL NAA+NON-PROBE: NOT DETECTED
FLUAV RNA NPH QL NAA+NON-PROBE: NOT DETECTED
FLUBV RNA NPH QL NAA+NON-PROBE: NOT DETECTED
GFR SERPL CREATININE-BSD FRML MDRD: 53 ML/MIN/1.73M2
GLUCOSE BLD-MCNC: 202 MG/DL (ref 70–99)
GLUCOSE BLD-MCNC: 209 MG/DL (ref 70–99)
GLUCOSE BLD-MCNC: 215 MG/DL (ref 70–99)
GLUCOSE BLD-MCNC: 228 MG/DL (ref 70–99)
GLUCOSE BLD-MCNC: 229 MG/DL (ref 70–99)
GLUCOSE BLD-MCNC: 234 MG/DL (ref 70–99)
GLUCOSE SERPL-MCNC: 214 MG/DL (ref 70–99)
GLUCOSE, URINE: 100 MG/DL
HADV DNA NPH QL NAA+NON-PROBE: NOT DETECTED
HCOV 229E RNA NPH QL NAA+NON-PROBE: NOT DETECTED
HCOV HKU1 RNA NPH QL NAA+NON-PROBE: NOT DETECTED
HCOV NL63 RNA NPH QL NAA+NON-PROBE: NOT DETECTED
HCOV OC43 RNA NPH QL NAA+NON-PROBE: NOT DETECTED
HCT VFR BLD CALC: 42.9 % (ref 42–52)
HEMOGLOBIN: 14.6 GM/DL (ref 13.5–18)
HMPV RNA NPH QL NAA+NON-PROBE: NOT DETECTED
HPIV1 RNA NPH QL NAA+NON-PROBE: NOT DETECTED
HPIV2 RNA NPH QL NAA+NON-PROBE: NOT DETECTED
HPIV3 RNA NPH QL NAA+NON-PROBE: NOT DETECTED
HPIV4 RNA NPH QL NAA+NON-PROBE: NOT DETECTED
IMMATURE NEUTROPHIL %: 0.5 % (ref 0–0.43)
KETONES, URINE: ABNORMAL MG/DL
LEUKOCYTE ESTERASE, URINE: NEGATIVE
LYMPHOCYTES ABSOLUTE: 0.6 K/CU MM
LYMPHOCYTES RELATIVE PERCENT: 6.4 % (ref 24–44)
M PNEUMO DNA NPH QL NAA+NON-PROBE: NOT DETECTED
MCH RBC QN AUTO: 31.4 PG (ref 27–31)
MCHC RBC AUTO-ENTMCNC: 34 % (ref 32–36)
MCV RBC AUTO: 92.3 FL (ref 78–100)
MONOCYTES ABSOLUTE: 0.9 K/CU MM
MONOCYTES RELATIVE PERCENT: 9.2 % (ref 0–4)
MUCUS: ABNORMAL HPF
NITRITE URINE, QUANTITATIVE: NEGATIVE
NUCLEATED RBC %: 0 %
PDW BLD-RTO: 13.2 % (ref 11.7–14.9)
PH, URINE: 5 (ref 5–8)
PLATELET # BLD: 155 K/CU MM (ref 140–440)
PMV BLD AUTO: 10.4 FL (ref 7.5–11.1)
POTASSIUM SERPL-SCNC: 4.1 MMOL/L (ref 3.5–5.1)
PROCALCITONIN SERPL-MCNC: 1.89 NG/ML
PROTEIN UA: 30 MG/DL
RBC # BLD: 4.65 M/CU MM (ref 4.6–6.2)
RBC URINE: <1 /HPF (ref 0–3)
RSV RNA NPH QL NAA+NON-PROBE: NOT DETECTED
RV+EV RNA NPH QL NAA+NON-PROBE: NOT DETECTED
SARS-COV-2 RNA NPH QL NAA+NON-PROBE: ABNORMAL
SEGMENTED NEUTROPHILS ABSOLUTE COUNT: 7.8 K/CU MM
SEGMENTED NEUTROPHILS RELATIVE PERCENT: 83.6 % (ref 36–66)
SODIUM BLD-SCNC: 135 MMOL/L (ref 135–145)
SPECIFIC GRAVITY UA: 1.02 (ref 1–1.03)
SQUAMOUS EPITHELIAL: 1 /HPF
TOTAL IMMATURE NEUTOROPHIL: 0.05 K/CU MM
TOTAL NUCLEATED RBC: 0 K/CU MM
TOTAL PROTEIN: 7.2 GM/DL (ref 6.4–8.2)
TRICHOMONAS: ABNORMAL /HPF
TROPONIN T: 0.02 NG/ML
UROBILINOGEN, URINE: 0.2 MG/DL (ref 0.2–1)
WBC # BLD: 9.3 K/CU MM (ref 4–10.5)
WBC UA: <1 /HPF (ref 0–2)

## 2023-04-05 PROCEDURE — 80053 COMPREHEN METABOLIC PANEL: CPT

## 2023-04-05 PROCEDURE — 6370000000 HC RX 637 (ALT 250 FOR IP): Performed by: INTERNAL MEDICINE

## 2023-04-05 PROCEDURE — 6360000002 HC RX W HCPCS: Performed by: STUDENT IN AN ORGANIZED HEALTH CARE EDUCATION/TRAINING PROGRAM

## 2023-04-05 PROCEDURE — 84484 ASSAY OF TROPONIN QUANT: CPT

## 2023-04-05 PROCEDURE — 84145 PROCALCITONIN (PCT): CPT

## 2023-04-05 PROCEDURE — 87040 BLOOD CULTURE FOR BACTERIA: CPT

## 2023-04-05 PROCEDURE — 6370000000 HC RX 637 (ALT 250 FOR IP): Performed by: STUDENT IN AN ORGANIZED HEALTH CARE EDUCATION/TRAINING PROGRAM

## 2023-04-05 PROCEDURE — 71045 X-RAY EXAM CHEST 1 VIEW: CPT

## 2023-04-05 PROCEDURE — 2580000003 HC RX 258: Performed by: STUDENT IN AN ORGANIZED HEALTH CARE EDUCATION/TRAINING PROGRAM

## 2023-04-05 PROCEDURE — 36415 COLL VENOUS BLD VENIPUNCTURE: CPT

## 2023-04-05 PROCEDURE — 0202U NFCT DS 22 TRGT SARS-COV-2: CPT

## 2023-04-05 PROCEDURE — 99233 SBSQ HOSP IP/OBS HIGH 50: CPT | Performed by: INTERNAL MEDICINE

## 2023-04-05 PROCEDURE — 82962 GLUCOSE BLOOD TEST: CPT

## 2023-04-05 PROCEDURE — 6370000000 HC RX 637 (ALT 250 FOR IP): Performed by: FAMILY MEDICINE

## 2023-04-05 PROCEDURE — APPNB45 APP NON BILLABLE 31-45 MINUTES: Performed by: NURSE PRACTITIONER

## 2023-04-05 PROCEDURE — 94761 N-INVAS EAR/PLS OXIMETRY MLT: CPT

## 2023-04-05 PROCEDURE — 81001 URINALYSIS AUTO W/SCOPE: CPT

## 2023-04-05 PROCEDURE — 85025 COMPLETE CBC W/AUTO DIFF WBC: CPT

## 2023-04-05 PROCEDURE — 93005 ELECTROCARDIOGRAM TRACING: CPT | Performed by: FAMILY MEDICINE

## 2023-04-05 PROCEDURE — 87086 URINE CULTURE/COLONY COUNT: CPT

## 2023-04-05 PROCEDURE — 2060000000 HC ICU INTERMEDIATE R&B

## 2023-04-05 PROCEDURE — 93010 ELECTROCARDIOGRAM REPORT: CPT | Performed by: INTERNAL MEDICINE

## 2023-04-05 RX ORDER — VITAMIN B COMPLEX
1000 TABLET ORAL DAILY
Status: DISCONTINUED | OUTPATIENT
Start: 2023-04-05 | End: 2023-04-07 | Stop reason: HOSPADM

## 2023-04-05 RX ORDER — ASCORBIC ACID 500 MG
500 TABLET ORAL DAILY
Status: DISCONTINUED | OUTPATIENT
Start: 2023-04-05 | End: 2023-04-07 | Stop reason: HOSPADM

## 2023-04-05 RX ORDER — ZINC SULFATE 50(220)MG
50 CAPSULE ORAL DAILY
Status: DISCONTINUED | OUTPATIENT
Start: 2023-04-05 | End: 2023-04-07 | Stop reason: HOSPADM

## 2023-04-05 RX ORDER — GUAIFENESIN/DEXTROMETHORPHAN 100-10MG/5
5 SYRUP ORAL EVERY 6 HOURS PRN
Status: DISCONTINUED | OUTPATIENT
Start: 2023-04-05 | End: 2023-04-07 | Stop reason: HOSPADM

## 2023-04-05 RX ADMIN — Medication 1000 UNITS: at 22:13

## 2023-04-05 RX ADMIN — CLONIDINE HYDROCHLORIDE 0.2 MG: 0.1 TABLET ORAL at 21:59

## 2023-04-05 RX ADMIN — OXYCODONE HYDROCHLORIDE AND ACETAMINOPHEN 500 MG: 500 TABLET ORAL at 22:14

## 2023-04-05 RX ADMIN — GUAIFENESIN SYRUP AND DEXTROMETHORPHAN 5 ML: 100; 10 SYRUP ORAL at 18:32

## 2023-04-05 RX ADMIN — SODIUM CHLORIDE, PRESERVATIVE FREE 10 ML: 5 INJECTION INTRAVENOUS at 09:16

## 2023-04-05 RX ADMIN — ACETAMINOPHEN 650 MG: 325 TABLET ORAL at 00:13

## 2023-04-05 RX ADMIN — ASPIRIN 81 MG 81 MG: 81 TABLET ORAL at 08:57

## 2023-04-05 RX ADMIN — ZINC SULFATE 220 MG (50 MG) CAPSULE 50 MG: CAPSULE at 22:14

## 2023-04-05 RX ADMIN — OXYBUTYNIN CHLORIDE 5 MG: 5 TABLET ORAL at 08:56

## 2023-04-05 RX ADMIN — ENOXAPARIN SODIUM 120 MG: 150 INJECTION SUBCUTANEOUS at 08:57

## 2023-04-05 RX ADMIN — ENOXAPARIN SODIUM 120 MG: 150 INJECTION SUBCUTANEOUS at 22:00

## 2023-04-05 RX ADMIN — ACETAMINOPHEN 650 MG: 325 TABLET ORAL at 04:55

## 2023-04-05 RX ADMIN — ATORVASTATIN CALCIUM 20 MG: 10 TABLET, FILM COATED ORAL at 08:56

## 2023-04-05 RX ADMIN — METOPROLOL TARTRATE 25 MG: 25 TABLET, FILM COATED ORAL at 08:56

## 2023-04-05 RX ADMIN — CLONIDINE HYDROCHLORIDE 0.2 MG: 0.1 TABLET ORAL at 08:56

## 2023-04-05 RX ADMIN — METOPROLOL TARTRATE 25 MG: 25 TABLET, FILM COATED ORAL at 21:59

## 2023-04-05 RX ADMIN — SODIUM CHLORIDE, PRESERVATIVE FREE 10 ML: 5 INJECTION INTRAVENOUS at 22:01

## 2023-04-05 RX ADMIN — GUAIFENESIN SYRUP AND DEXTROMETHORPHAN 5 ML: 100; 10 SYRUP ORAL at 11:27

## 2023-04-05 RX ADMIN — INSULIN LISPRO 2 UNITS: 100 INJECTION, SOLUTION INTRAVENOUS; SUBCUTANEOUS at 18:33

## 2023-04-05 RX ADMIN — INSULIN LISPRO 2 UNITS: 100 INJECTION, SOLUTION INTRAVENOUS; SUBCUTANEOUS at 08:57

## 2023-04-05 RX ADMIN — OXYBUTYNIN CHLORIDE 5 MG: 5 TABLET ORAL at 21:59

## 2023-04-05 RX ADMIN — ACETAMINOPHEN 650 MG: 325 TABLET ORAL at 11:27

## 2023-04-05 RX ADMIN — ACETAMINOPHEN 650 MG: 325 TABLET ORAL at 18:32

## 2023-04-05 RX ADMIN — INSULIN GLARGINE 25 UNITS: 100 INJECTION, SOLUTION SUBCUTANEOUS at 08:57

## 2023-04-05 RX ADMIN — GUAIFENESIN SYRUP AND DEXTROMETHORPHAN 5 ML: 100; 10 SYRUP ORAL at 00:42

## 2023-04-05 RX ADMIN — INSULIN GLARGINE 25 UNITS: 100 INJECTION, SOLUTION SUBCUTANEOUS at 21:59

## 2023-04-05 RX ADMIN — OXYBUTYNIN CHLORIDE 5 MG: 5 TABLET ORAL at 12:40

## 2023-04-05 RX ADMIN — INSULIN LISPRO 2 UNITS: 100 INJECTION, SOLUTION INTRAVENOUS; SUBCUTANEOUS at 12:40

## 2023-04-05 ASSESSMENT — PAIN DESCRIPTION - LOCATION
LOCATION: HIP
LOCATION: HEAD

## 2023-04-05 ASSESSMENT — PAIN SCALES - GENERAL
PAINLEVEL_OUTOF10: 0
PAINLEVEL_OUTOF10: 3
PAINLEVEL_OUTOF10: 1
PAINLEVEL_OUTOF10: 0

## 2023-04-05 NOTE — FLOWSHEET NOTE
Cath lab called this said nurse and stated cath was canceled for today as PT has been febrile and is now COVID positive. Cardiology at bedside and explained to PT why procedure was canceled. Diet changed from NPO to regular Cardiac diet.

## 2023-04-05 NOTE — FLOWSHEET NOTE
PT chest x-ray resulted Dr.Askash Regis Levine notified. PT also COVID positive signs have been posted on door and door closed for precautions.

## 2023-04-05 NOTE — PLAN OF CARE
Problem: Chronic Conditions and Co-morbidities  Goal: Patient's chronic conditions and co-morbidity symptoms are monitored and maintained or improved  Outcome: Progressing  Flowsheets (Taken 4/5/2023 0700)  Care Plan - Patient's Chronic Conditions and Co-Morbidity Symptoms are Monitored and Maintained or Improved: Monitor and assess patient's chronic conditions and comorbid symptoms for stability, deterioration, or improvement     Problem: Discharge Planning  Goal: Discharge to home or other facility with appropriate resources  Outcome: Progressing  Flowsheets (Taken 4/5/2023 0700)  Discharge to home or other facility with appropriate resources:   Arrange for needed discharge resources and transportation as appropriate   Identify barriers to discharge with patient and caregiver     Problem: Safety - Adult  Goal: Free from fall injury  Outcome: Progressing

## 2023-04-06 LAB
ALBUMIN SERPL-MCNC: 4.3 GM/DL (ref 3.4–5)
ALP BLD-CCNC: 73 IU/L (ref 40–128)
ALT SERPL-CCNC: 28 U/L (ref 10–40)
ANION GAP SERPL CALCULATED.3IONS-SCNC: 13 MMOL/L (ref 4–16)
AST SERPL-CCNC: 63 IU/L (ref 15–37)
BASOPHILS ABSOLUTE: 0 K/CU MM
BASOPHILS RELATIVE PERCENT: 0.2 % (ref 0–1)
BILIRUB SERPL-MCNC: 0.7 MG/DL (ref 0–1)
BUN SERPL-MCNC: 42 MG/DL (ref 6–23)
CALCIUM SERPL-MCNC: 9 MG/DL (ref 8.3–10.6)
CHLORIDE BLD-SCNC: 99 MMOL/L (ref 99–110)
CO2: 24 MMOL/L (ref 21–32)
CREAT SERPL-MCNC: 1.5 MG/DL (ref 0.9–1.3)
CRP SERPL HS-MCNC: 128.4 MG/L
CULTURE: NORMAL
DIFFERENTIAL TYPE: ABNORMAL
EOSINOPHILS ABSOLUTE: 0 K/CU MM
EOSINOPHILS RELATIVE PERCENT: 0.2 % (ref 0–3)
ERYTHROCYTE SEDIMENTATION RATE: 26 MM/HR (ref 0–20)
GFR SERPL CREATININE-BSD FRML MDRD: 49 ML/MIN/1.73M2
GLUCOSE BLD-MCNC: 248 MG/DL (ref 70–99)
GLUCOSE BLD-MCNC: 261 MG/DL (ref 70–99)
GLUCOSE SERPL-MCNC: 221 MG/DL (ref 70–99)
HCT VFR BLD CALC: 41.5 % (ref 42–52)
HEMOGLOBIN: 14.1 GM/DL (ref 13.5–18)
IMMATURE NEUTROPHIL %: 0.4 % (ref 0–0.43)
LACTATE: 1.8 MMOL/L (ref 0.5–1.9)
LYMPHOCYTES ABSOLUTE: 1.5 K/CU MM
LYMPHOCYTES RELATIVE PERCENT: 12.8 % (ref 24–44)
Lab: NORMAL
MCH RBC QN AUTO: 31.5 PG (ref 27–31)
MCHC RBC AUTO-ENTMCNC: 34 % (ref 32–36)
MCV RBC AUTO: 92.8 FL (ref 78–100)
MONOCYTES ABSOLUTE: 0.6 K/CU MM
MONOCYTES RELATIVE PERCENT: 5.4 % (ref 0–4)
NUCLEATED RBC %: 0 %
PDW BLD-RTO: 13.5 % (ref 11.7–14.9)
PLATELET # BLD: 152 K/CU MM (ref 140–440)
PMV BLD AUTO: 10.3 FL (ref 7.5–11.1)
POTASSIUM SERPL-SCNC: 4.1 MMOL/L (ref 3.5–5.1)
RBC # BLD: 4.47 M/CU MM (ref 4.6–6.2)
SEGMENTED NEUTROPHILS ABSOLUTE COUNT: 9.3 K/CU MM
SEGMENTED NEUTROPHILS RELATIVE PERCENT: 81 % (ref 36–66)
SODIUM BLD-SCNC: 136 MMOL/L (ref 135–145)
SPECIMEN: NORMAL
TOTAL IMMATURE NEUTOROPHIL: 0.05 K/CU MM
TOTAL NUCLEATED RBC: 0 K/CU MM
TOTAL PROTEIN: 6.7 GM/DL (ref 6.4–8.2)
WBC # BLD: 11.5 K/CU MM (ref 4–10.5)

## 2023-04-06 PROCEDURE — 6360000002 HC RX W HCPCS: Performed by: STUDENT IN AN ORGANIZED HEALTH CARE EDUCATION/TRAINING PROGRAM

## 2023-04-06 PROCEDURE — 99233 SBSQ HOSP IP/OBS HIGH 50: CPT | Performed by: INTERNAL MEDICINE

## 2023-04-06 PROCEDURE — 2580000003 HC RX 258: Performed by: STUDENT IN AN ORGANIZED HEALTH CARE EDUCATION/TRAINING PROGRAM

## 2023-04-06 PROCEDURE — 80053 COMPREHEN METABOLIC PANEL: CPT

## 2023-04-06 PROCEDURE — 94150 VITAL CAPACITY TEST: CPT

## 2023-04-06 PROCEDURE — 82962 GLUCOSE BLOOD TEST: CPT

## 2023-04-06 PROCEDURE — 6370000000 HC RX 637 (ALT 250 FOR IP): Performed by: INTERNAL MEDICINE

## 2023-04-06 PROCEDURE — 85652 RBC SED RATE AUTOMATED: CPT

## 2023-04-06 PROCEDURE — 2060000000 HC ICU INTERMEDIATE R&B

## 2023-04-06 PROCEDURE — 6370000000 HC RX 637 (ALT 250 FOR IP): Performed by: FAMILY MEDICINE

## 2023-04-06 PROCEDURE — 86140 C-REACTIVE PROTEIN: CPT

## 2023-04-06 PROCEDURE — 36415 COLL VENOUS BLD VENIPUNCTURE: CPT

## 2023-04-06 PROCEDURE — 6370000000 HC RX 637 (ALT 250 FOR IP): Performed by: STUDENT IN AN ORGANIZED HEALTH CARE EDUCATION/TRAINING PROGRAM

## 2023-04-06 PROCEDURE — 2700000000 HC OXYGEN THERAPY PER DAY

## 2023-04-06 PROCEDURE — 85025 COMPLETE CBC W/AUTO DIFF WBC: CPT

## 2023-04-06 PROCEDURE — 83605 ASSAY OF LACTIC ACID: CPT

## 2023-04-06 PROCEDURE — 94761 N-INVAS EAR/PLS OXIMETRY MLT: CPT

## 2023-04-06 RX ORDER — DEXAMETHASONE 4 MG/1
6 TABLET ORAL DAILY
Status: DISCONTINUED | OUTPATIENT
Start: 2023-04-06 | End: 2023-04-07 | Stop reason: HOSPADM

## 2023-04-06 RX ORDER — INSULIN LISPRO 100 [IU]/ML
3 INJECTION, SOLUTION INTRAVENOUS; SUBCUTANEOUS
Status: DISCONTINUED | OUTPATIENT
Start: 2023-04-06 | End: 2023-04-07

## 2023-04-06 RX ADMIN — ENOXAPARIN SODIUM 120 MG: 150 INJECTION SUBCUTANEOUS at 21:25

## 2023-04-06 RX ADMIN — ENOXAPARIN SODIUM 120 MG: 150 INJECTION SUBCUTANEOUS at 10:53

## 2023-04-06 RX ADMIN — INSULIN LISPRO 2 UNITS: 100 INJECTION, SOLUTION INTRAVENOUS; SUBCUTANEOUS at 18:43

## 2023-04-06 RX ADMIN — GUAIFENESIN SYRUP AND DEXTROMETHORPHAN 5 ML: 100; 10 SYRUP ORAL at 11:48

## 2023-04-06 RX ADMIN — ACETAMINOPHEN 650 MG: 325 TABLET ORAL at 11:45

## 2023-04-06 RX ADMIN — METOPROLOL TARTRATE 25 MG: 25 TABLET, FILM COATED ORAL at 21:20

## 2023-04-06 RX ADMIN — SODIUM CHLORIDE, PRESERVATIVE FREE 10 ML: 5 INJECTION INTRAVENOUS at 10:48

## 2023-04-06 RX ADMIN — CLONIDINE HYDROCHLORIDE 0.2 MG: 0.1 TABLET ORAL at 11:52

## 2023-04-06 RX ADMIN — INSULIN LISPRO 3 UNITS: 100 INJECTION, SOLUTION INTRAVENOUS; SUBCUTANEOUS at 10:54

## 2023-04-06 RX ADMIN — INSULIN LISPRO 2 UNITS: 100 INJECTION, SOLUTION INTRAVENOUS; SUBCUTANEOUS at 10:54

## 2023-04-06 RX ADMIN — CLONIDINE HYDROCHLORIDE 0.2 MG: 0.1 TABLET ORAL at 21:42

## 2023-04-06 RX ADMIN — SODIUM CHLORIDE, PRESERVATIVE FREE 10 ML: 5 INJECTION INTRAVENOUS at 21:25

## 2023-04-06 RX ADMIN — OXYBUTYNIN CHLORIDE 5 MG: 5 TABLET ORAL at 21:25

## 2023-04-06 RX ADMIN — INSULIN GLARGINE 25 UNITS: 100 INJECTION, SOLUTION SUBCUTANEOUS at 10:53

## 2023-04-06 RX ADMIN — INSULIN GLARGINE 25 UNITS: 100 INJECTION, SOLUTION SUBCUTANEOUS at 21:28

## 2023-04-06 RX ADMIN — OXYBUTYNIN CHLORIDE 5 MG: 5 TABLET ORAL at 14:00

## 2023-04-06 RX ADMIN — OXYCODONE HYDROCHLORIDE AND ACETAMINOPHEN 500 MG: 500 TABLET ORAL at 10:48

## 2023-04-06 RX ADMIN — DEXAMETHASONE 6 MG: 4 TABLET ORAL at 18:38

## 2023-04-06 RX ADMIN — ATORVASTATIN CALCIUM 20 MG: 10 TABLET, FILM COATED ORAL at 10:47

## 2023-04-06 RX ADMIN — INSULIN LISPRO 3 UNITS: 100 INJECTION, SOLUTION INTRAVENOUS; SUBCUTANEOUS at 18:43

## 2023-04-06 RX ADMIN — ASPIRIN 81 MG 81 MG: 81 TABLET ORAL at 10:47

## 2023-04-06 RX ADMIN — ACETAMINOPHEN 650 MG: 325 TABLET ORAL at 18:36

## 2023-04-06 RX ADMIN — METOPROLOL TARTRATE 25 MG: 25 TABLET, FILM COATED ORAL at 11:52

## 2023-04-06 RX ADMIN — OXYBUTYNIN CHLORIDE 5 MG: 5 TABLET ORAL at 10:46

## 2023-04-06 ASSESSMENT — PAIN SCALES - GENERAL
PAINLEVEL_OUTOF10: 4
PAINLEVEL_OUTOF10: 0
PAINLEVEL_OUTOF10: 5

## 2023-04-06 ASSESSMENT — PAIN DESCRIPTION - FREQUENCY: FREQUENCY: INTERMITTENT

## 2023-04-06 ASSESSMENT — PAIN SCALES - WONG BAKER: WONGBAKER_NUMERICALRESPONSE: 0

## 2023-04-06 ASSESSMENT — PAIN - FUNCTIONAL ASSESSMENT: PAIN_FUNCTIONAL_ASSESSMENT: ACTIVITIES ARE NOT PREVENTED

## 2023-04-06 ASSESSMENT — PAIN DESCRIPTION - DESCRIPTORS
DESCRIPTORS: ACHING
DESCRIPTORS: OTHER (COMMENT)

## 2023-04-06 ASSESSMENT — PAIN DESCRIPTION - LOCATION: LOCATION: HEAD

## 2023-04-06 ASSESSMENT — PAIN DESCRIPTION - ONSET: ONSET: SUDDEN

## 2023-04-06 NOTE — PLAN OF CARE
Problem: Chronic Conditions and Co-morbidities  Goal: Patient's chronic conditions and co-morbidity symptoms are monitored and maintained or improved  Outcome: Progressing  Flowsheets (Taken 4/5/2023 2153 by Clive Rogers RN)  Care Plan - Patient's Chronic Conditions and Co-Morbidity Symptoms are Monitored and Maintained or Improved: Monitor and assess patient's chronic conditions and comorbid symptoms for stability, deterioration, or improvement     Problem: Discharge Planning  Goal: Discharge to home or other facility with appropriate resources  Outcome: Progressing  Flowsheets (Taken 4/5/2023 2153 by Clive Rogers RN)  Discharge to home or other facility with appropriate resources: Arrange for needed discharge resources and transportation as appropriate     Problem: Safety - Adult  Goal: Free from fall injury  Outcome: Progressing     Problem: Pain  Goal: Verbalizes/displays adequate comfort level or baseline comfort level  Outcome: Progressing

## 2023-04-07 VITALS
HEART RATE: 71 BPM | OXYGEN SATURATION: 94 % | WEIGHT: 264.99 LBS | BODY MASS INDEX: 35.89 KG/M2 | RESPIRATION RATE: 17 BRPM | TEMPERATURE: 97.9 F | DIASTOLIC BLOOD PRESSURE: 66 MMHG | SYSTOLIC BLOOD PRESSURE: 101 MMHG | HEIGHT: 72 IN

## 2023-04-07 LAB — GLUCOSE BLD-MCNC: 312 MG/DL (ref 70–99)

## 2023-04-07 PROCEDURE — 82962 GLUCOSE BLOOD TEST: CPT

## 2023-04-07 PROCEDURE — 6370000000 HC RX 637 (ALT 250 FOR IP): Performed by: STUDENT IN AN ORGANIZED HEALTH CARE EDUCATION/TRAINING PROGRAM

## 2023-04-07 PROCEDURE — 2580000003 HC RX 258: Performed by: STUDENT IN AN ORGANIZED HEALTH CARE EDUCATION/TRAINING PROGRAM

## 2023-04-07 PROCEDURE — 6360000002 HC RX W HCPCS: Performed by: STUDENT IN AN ORGANIZED HEALTH CARE EDUCATION/TRAINING PROGRAM

## 2023-04-07 RX ORDER — INSULIN LISPRO 100 [IU]/ML
6 INJECTION, SOLUTION INTRAVENOUS; SUBCUTANEOUS
Status: DISCONTINUED | OUTPATIENT
Start: 2023-04-07 | End: 2023-04-07 | Stop reason: HOSPADM

## 2023-04-07 RX ORDER — ENOXAPARIN SODIUM 100 MG/ML
30 INJECTION SUBCUTANEOUS 2 TIMES DAILY
Status: DISCONTINUED | OUTPATIENT
Start: 2023-04-07 | End: 2023-04-07 | Stop reason: HOSPADM

## 2023-04-07 RX ORDER — DEXAMETHASONE 6 MG/1
6 TABLET ORAL DAILY
Qty: 8 TABLET | Refills: 0 | Status: SHIPPED | OUTPATIENT
Start: 2023-04-08 | End: 2023-04-16

## 2023-04-07 RX ORDER — INSULIN GLARGINE 100 [IU]/ML
30 INJECTION, SOLUTION SUBCUTANEOUS 2 TIMES DAILY
Status: DISCONTINUED | OUTPATIENT
Start: 2023-04-07 | End: 2023-04-07 | Stop reason: HOSPADM

## 2023-04-07 RX ORDER — GUAIFENESIN/DEXTROMETHORPHAN 100-10MG/5
5 SYRUP ORAL EVERY 6 HOURS PRN
Qty: 120 ML | Refills: 0 | Status: SHIPPED | OUTPATIENT
Start: 2023-04-07 | End: 2023-04-17

## 2023-04-07 RX ORDER — ENOXAPARIN SODIUM 100 MG/ML
40 INJECTION SUBCUTANEOUS DAILY
Status: DISCONTINUED | OUTPATIENT
Start: 2023-04-07 | End: 2023-04-07 | Stop reason: DRUGHIGH

## 2023-04-07 RX ADMIN — OXYBUTYNIN CHLORIDE 5 MG: 5 TABLET ORAL at 08:31

## 2023-04-07 RX ADMIN — DEXAMETHASONE 6 MG: 4 TABLET ORAL at 08:32

## 2023-04-07 RX ADMIN — Medication 1000 UNITS: at 08:32

## 2023-04-07 RX ADMIN — ATORVASTATIN CALCIUM 20 MG: 10 TABLET, FILM COATED ORAL at 08:32

## 2023-04-07 RX ADMIN — SODIUM CHLORIDE, PRESERVATIVE FREE 10 ML: 5 INJECTION INTRAVENOUS at 08:33

## 2023-04-07 RX ADMIN — INSULIN LISPRO 6 UNITS: 100 INJECTION, SOLUTION INTRAVENOUS; SUBCUTANEOUS at 08:34

## 2023-04-07 RX ADMIN — INSULIN GLARGINE 30 UNITS: 100 INJECTION, SOLUTION SUBCUTANEOUS at 08:33

## 2023-04-07 RX ADMIN — CLONIDINE HYDROCHLORIDE 0.2 MG: 0.1 TABLET ORAL at 08:32

## 2023-04-07 RX ADMIN — ENOXAPARIN SODIUM 30 MG: 100 INJECTION SUBCUTANEOUS at 08:31

## 2023-04-07 RX ADMIN — ZINC SULFATE 220 MG (50 MG) CAPSULE 50 MG: CAPSULE at 08:32

## 2023-04-07 RX ADMIN — ASPIRIN 81 MG 81 MG: 81 TABLET ORAL at 08:31

## 2023-04-07 RX ADMIN — INSULIN LISPRO 6 UNITS: 100 INJECTION, SOLUTION INTRAVENOUS; SUBCUTANEOUS at 08:33

## 2023-04-07 RX ADMIN — OXYCODONE HYDROCHLORIDE AND ACETAMINOPHEN 500 MG: 500 TABLET ORAL at 08:32

## 2023-04-07 ASSESSMENT — PAIN SCALES - GENERAL
PAINLEVEL_OUTOF10: 0
PAINLEVEL_OUTOF10: 0

## 2023-04-07 NOTE — PLAN OF CARE
Problem: Chronic Conditions and Co-morbidities  Goal: Patient's chronic conditions and co-morbidity symptoms are monitored and maintained or improved  4/7/2023 1023 by Omar Paula RN  Outcome: Adequate for Discharge  4/7/2023 0701 by Rosio Daniels RN  Outcome: Progressing     Problem: Discharge Planning  Goal: Discharge to home or other facility with appropriate resources  4/7/2023 1023 by Omar Paula RN  Outcome: Adequate for Discharge  4/7/2023 0701 by Rosio Daniels RN  Outcome: Progressing     Problem: Safety - Adult  Goal: Free from fall injury  4/7/2023 1023 by Omar Paula RN  Outcome: Adequate for Discharge  4/7/2023 0701 by Rosio Daniels RN  Outcome: Progressing     Problem: Pain  Goal: Verbalizes/displays adequate comfort level or baseline comfort level  4/7/2023 1023 by Omar Paula RN  Outcome: Adequate for Discharge  4/7/2023 0701 by Rosio Daniels RN  Outcome: Progressing

## 2023-04-07 NOTE — DISCHARGE SUMMARY
aspirin 81 MG chewable tablet  Take 1 tablet by mouth daily     atorvastatin 20 MG tablet  Commonly known as: LIPITOR  Take 1 tablet by mouth daily     cloNIDine 0.2 MG tablet  Commonly known as: CATAPRES     glipiZIDE 5 MG tablet  Commonly known as: GLUCOTROL     insulin glargine 100 UNIT/ML injection vial  Commonly known as: LANTUS     metFORMIN 500 MG tablet  Commonly known as: GLUCOPHAGE     oxybutynin 5 MG tablet  Commonly known as: DITROPAN  Take 1 tablet by mouth 3 times daily            STOP taking these medications      benazepril 40 MG tablet  Commonly known as: LOTENSIN     chlorthalidone 25 MG tablet  Commonly known as: HYGROTON     clopidogrel 75 MG tablet  Commonly known as: PLAVIX     finasteride 5 MG tablet  Commonly known as: PROSCAR     loratadine 10 MG capsule  Commonly known as: CLARITIN     nitroGLYCERIN 0.4 MG SL tablet  Commonly known as: NITROSTAT     polyethylene glycol 17 g packet  Commonly known as: GLYCOLAX     spironolactone 25 MG tablet  Commonly known as: ALDACTONE               Where to Get Your Medications        These medications were sent to [facility] [place] [id number]      Phone: [phone]   dexamethasone 6 MG tablet  guaiFENesin-dextromethorphan 100-10 MG/5ML syrup  metoprolol tartrate 25 MG tablet        Objective Findings at Discharge:   /66   Pulse 71   Temp 97.9 °F (36.6 °C) (Oral)   Resp 17   Ht 6' (1.829 m)   Wt 264 lb 15.9 oz (120.2 kg)   SpO2 94%   BMI 35.94 kg/m²       Physical Exam:   General: NAD  Eyes: EOMI  ENT: neck supple  Cardiovascular: Regular rate. Respiratory: Clear to auscultation, no rales. Gastrointestinal: Soft, non tender. Genitourinary: no suprapubic tenderness  Musculoskeletal: No edema  Skin: warm, dry  Neuro: Alert, oriented to time place person. Strength 5 out of 5 in bilateral upper and lower extremities. Psych: Mood appropriate.

## 2023-04-07 NOTE — PROGRESS NOTES
04/05/23 0654   Encounter Summary   Encounter Overview/Reason  Spiritual/Emotional Needs   Service Provided For: Patient   [facility]   Support System Spouse   Last Encounter  04/05/23  (Patient asked for prayer for felipe cath today. Does not have a chruch but believe in God.)   Complexity of Encounter Low   Begin Time 0647   End Time  0655   Total Time Calculated 8 min   Encounter    Type Pre-Op   Spiritual/Emotional needs   Type Spiritual Support   Assessment/Intervention/Outcome   Assessment Peaceful; Hopeful   Intervention Active listening;Discussed belief system/Temple practices/paulo;Discussed illness injury and its impact; Discussed relationship with God;Empowerment;Nurtured Hope;Prayer (assurance of)/[place];Sustaining Presence/Ministry of presence   Outcome Encouraged;Engaged in conversation;Expressed Gratitude   Plan and Referrals   Plan/Referrals Continue Support (comment)  (as needed)
Admitted by my colleague earlier this morning. Patient seen and examined. Patient chest pain-free right now. Has a history of coronary disease with stents. Compliant with medications. As per patient he is going for cardiac cath this afternoon after discussion with cardiology. Likely discharge within 24 hours pending results of the cardiac cath.
Dr Bella Parks paged via [facility] for new consult for hx of cAD s/p 5 stents, came w pain between shoulder blades. ACS rule out. Goldie Griffin
Dr. Doris Kwon notified of new consult via PS.
LOVENOX PROPHYLAXIS EVALUATION    Wt Readings from Last 3 Encounters:   04/04/23 264 lb 15.9 oz (120.2 kg)   04/03/23 270 lb (122.5 kg)   06/25/21 265 lb 6.4 oz (120.4 kg)       Estimated Creatinine Clearance: 59 mL/min (A) (based on SCr of 1.5 mg/dL (H)).   Recent Labs     04/05/23  0526 04/06/23  0816   BUN 35* 42*   CREATININE 1.4* 1.5*    152   HGB 14.6 14.1   HCT 42.9 41.5*       Weight Range: 101-150.9 kg    CRCL = 30 or greater    50.9 kg   and below     .9  kg   101-150.9 kg   151-174.9  kg   175 kg  or greater     30mg subq  daily     40mg subq daily    (or 30mg subq BID orthopedic)     30mg subq  BID   40mg subq  BID   60mg subq BID       Per P/T protocol for appropriate subq anticoagulation by weight and CRCL change to:    Enoxaparin 30mg subq BID      Lexie Bernheim, [address] [facility]  7:36 AM  04/07/23
LOVENOX PROPHYLAXIS EVALUATION    Wt Readings from Last 3 Encounters:   04/04/23 264 lb 15.9 oz (120.2 kg)   04/03/23 270 lb (122.5 kg)   06/25/21 265 lb 6.4 oz (120.4 kg)       Estimated Creatinine Clearance: 63 mL/min (A) (based on SCr of 1.4 mg/dL (H)).   Recent Labs     04/03/23  1240 04/04/23  0153   BUN 42* 38*   CREATININE 1.6* 1.4*    175   HGB 15.4 14.1   HCT 45.0 41.1*       Weight Range: 101-150.9 kg    CRCL = 30 or greater    50.9 kg   and below     .9  kg   101-150.9 kg   151-174.9  kg   175 kg  or greater     30mg subq  daily     40mg subq daily    (or 30mg subq BID orthopedic)     30mg subq  BID   40mg subq  BID   60mg subq BID       Per P/T protocol for appropriate subq anticoagulation by weight and CRCL change to:    Enoxaparin 30mg subq BID      Ned Huynh RPH  3:26 AM  04/04/23
Name:  Yancy Mejia /Age/Sex: 1950  (67 y.o. male)   MRN & CSN:  [id number] & [id number] Encounter Date/Time: 2023 4:50 AM EDT    Location:  -A PCP: Sowmya Billy       Call addressed around 2023 4:50 AM EDT     Vitals:   Vitals:    23 0444   BP: (!) 159/67   Pulse: (!) 113   Resp: 16   Temp: (!) 103 °F (39.4 °C)   SpO2: 94%         APRN was called for chest pain with SOB    Patient seen and examined in        OBJECTIVE: Pt states he feels \"awful\" and is hot, requesting a fan. Pt admits mid-sternal/epigastric \"burning\" sensation that is intermittent. Pt denies radiation, jaw pain, back pain, N/V, burping/bloating, dizziness, lightheadedness, chills, or HA; admits that he started coughing earlier; PO cough medication was ordered. Pt is up at bedside, fanning himself. He denies GERD history. General: AxOx3  HEENT: PERRLA. Vision grossly intact. [place]. Oropharynx clear. Neck: Supple. No JVD. CV: Tachy  Pulm: CTA, no audible wheezing, stridor; dry, hacking cough  GI: NEG N/V, No ABD tenderness/distention  : No CVA or suprapubic tenderness. Godoy catheter not present. Skin: Warm to touch distally. MSK: No gross joint deformities. Neuro: CNs grossly intact. Normal speech. No focal deficit. Psych: Good judgement and reason. ASSESSMENT/PLAN: EKG ordered/reviewed; Ordered CXR, Trop, ProCal, CMP, CBC, resp panel, UA/Culture. Pt blood cultures have not been completed. Pt has temp of 103 orally; Tylenol given. Nafisa Granger brought to bedside for pt comfort. Will continue to monitor.       Electronically signed by MARILYN Colby CNP on 2023 at 4:50 AM
Patient carlitos down to 40's but asymptomatic. No any chest pain noted. BP is soft but MAP is 60 and above. Currently on room air, sating above 90's. Made Hospitalist on shift made aware. Will continue to monitor.
Patient discharged at this time. AVS reviewed with patient, including new medications, their uses and side effects. Patient expressed understanding of medications using teach-back method. All up coming appointments, if any, reviewed. Tele and PIV reviewed. Patient taken to car by JOSE Smith, via wheelchair, with all personal possessions in hand.
Physician Progress Note      PATIENT:               Hayley Mosqueda  CSN #:                  [id number]  :                       1950  ADMIT DATE:       4/3/2023 11:02 PM  [address] DATE:  RESPONDING  PROVIDER #:        Angelica Walden MD          QUERY TEXT:    Hospitalists,    Pt admitted with chest pain and found to have COVID infection. Cardiology   documents chest pain as due to COVID. If possible, please document in   progress notes and discharge summary:    The medical record reflects the following:  Risk Factors: COVID  Clinical Indicators: Per Cardiology documentation: \"Cardiac cath canceled as   we have an alternate explanation for his pain which is also resolved now   serial troponins are negative he is positive for COVID. COVID treatment as per   primary team, Chronically elevated troponin level in setting of renal failure  Treatment: labs, imaging, steroids, Cardiology consult, ECHO, isolation   precautions    Thank you,  Mary Alice Chirinos RN [facility]  [phone]  Options provided:  -- Chest pain due to COVID confirmed present on admission  -- Chest pain due to COVID ruled out, this patient has chest pain due to ##,   please specify cause. -- Other - I will add my own diagnosis  -- Disagree - Not applicable / Not valid  -- Disagree - Clinically unable to determine / Unknown  -- Refer to Clinical Documentation Reviewer    PROVIDER RESPONSE TEXT:    The diagnosis of chest pain due to COVID was confirmed as present on   admission. Query created by: Viridiana Tucker on 2023 1:27 PM      Electronically signed by:   Angelica Walden MD 2023 2:19 PM
Plan AKOSUA due to abnormal echo  Alternates and risk of the procedure were dicussed in detail  Patient is in agreement to proceed  Mallampati is 2  ASA is  3
Raman served Dr. Maikol Justin. Patient complaining of headache and body aches. Wife at bedside.
Subjective: Patient denies any active CP, SOB has improved. Plan:  AKOSUA yesterday: EF 50% no vegetation valve   CP: LHC today  Hx of CAD-RCA and LAD PCI in 2020. COVID-19 positive  JOSE: soft B/P medications held. Electronically signed by Naty Hdz.  MARILYN Ackerman - CNP on 4/5/2023 at 1:17 PM
Sepsis, present on admission, now resolved  -- Sepsis, developed following admission  -- Sepsis was ruled out  -- Other - I will add my own diagnosis  -- Disagree - Not applicable / Not valid  -- Disagree - Clinically unable to determine / Unknown  -- Refer to Clinical Documentation Reviewer    PROVIDER RESPONSE TEXT:    After further study, sepsis was ruled out for this patient. Query created by: Alisa Borges on 4/6/2023 1:37 PM      Electronically signed by:   Karina Avila MD 4/6/2023 2:55 PM
cyst excision (1975); and laminectomy (5/20/12). Social History:   reports that he quit smoking about 33 years ago. He has a 50.00 pack-year smoking history. He has never used smokeless tobacco. He reports current alcohol use. He reports that he does not use drugs. Family history:  family history includes COPD in his sister; Cancer in his brother, brother, father, and another family member; Diabetes in his maternal aunt, maternal aunt, mother, and another family member; Heart Attack in his sister; Heart Disease in his brother, maternal aunt, and mother; Heart Failure (age of onset: 52) in his brother; High Blood Pressure in his mother; Other in his paternal grandmother; Other (age of onset: 47) in his mother. Allergies   Allergen Reactions    Celebrex [Celecoxib]      From old chart     Cialis [Tadalafil]      From old chart     Duloxetine Hcl Other (See Comments)     Caused depression    Gabapentin Other (See Comments)     Caused depression    Lyrica [Pregabalin]      Confusion      Pravastatin      From old chart     Zetia [Ezetimibe]      From old chart        Review of Systems:    All 14 systems were reviewed and are negative  Except for the positive findings  which as documented     BP 91/62   Pulse 65   Temp 98.6 °F (37 °C) (Oral)   Resp 25   Ht 6' (1.829 m)   Wt 264 lb 15.9 oz (120.2 kg)   SpO2 98%   BMI 35.94 kg/m²     Intake/Output Summary (Last 24 hours) at 4/5/2023 1526  Last data filed at 4/5/2023 1050  Gross per 24 hour   Intake 670 ml   Output 450 ml   Net 220 ml     Physical Exam:  Constitutional:  Well developed, Well nourished, No acute distress, Non-toxic appearance. HENT:  Normocephalic, Atraumatic, Bilateral external ears normal, Oropharynx moist, No oral exudates, Nose normal. Neck- Normal range of motion, No tenderness, Supple, No stridor. Eyes:  PERRL, EOMI, Conjunctiva normal, No discharge.    Respiratory:  Normal breath sounds, No respiratory distress, No wheezing, No chest
words and phrases that may be inappropriate. If there are any questions or concerns please feel free to contact the dictating provider for clarification. Diet ADULT DIET; Regular; Low Fat/Low Chol/High Fiber/LORE   DVT Prophylaxis [x] Lovenox, []  Heparin, [] SCDs, [] Ambulation,  [] Eliquis, [] Xarelto  [] Coumadin   Code Status Full Code   Disposition From: Home  Expected Disposition: Home  Estimated Date of Discharge: 2 days  Patient requires continued admission due to infectious work-up, cardiac work-up   Surrogate Decision Maker/ POA Spouse Eleanor     Subjective:     Chief Complaint: Chest pain    Reports he had a rough night overnight when he spiked a fever to 103. Has been feeling poorly since then. Denies any chest pain, shortness of breath, lightheadedness or dizziness. Has mild cough. Review of Systems:    Review of Systems  Per interval history. Objective: Intake/Output Summary (Last 24 hours) at 4/5/2023 1116  Last data filed at 4/5/2023 1050  Gross per 24 hour   Intake 670 ml   Output 450 ml   Net 220 ml        Vitals:   Vitals:    04/05/23 0924   BP:    Pulse: 89   Resp:    Temp:    SpO2: 94%       Physical Exam:   Physical Exam  Constitutional:       General: He is not in acute distress. Appearance: He is ill-appearing. HENT:      Mouth/Throat:      Mouth: Mucous membranes are moist.      Pharynx: No posterior oropharyngeal erythema. Eyes:      General: No scleral icterus. Extraocular Movements: Extraocular movements intact. Pupils: Pupils are equal, round, and reactive to light. Cardiovascular:      Rate and Rhythm: Normal rate and regular rhythm. Pulses: Normal pulses. Heart sounds: No murmur heard. Pulmonary:      Effort: Pulmonary effort is normal.      Breath sounds: No wheezing or rales. Abdominal:      General: Bowel sounds are normal. There is no distension. Palpations: Abdomen is soft. Tenderness: There is no abdominal tenderness.
elevated  Abdomen/GI:  Bowel sounds normal, Soft, No tenderness, No masses, No pulsatile masses. Musculoskeletal:  Intact distal pulses, No edema, No tenderness, No cyanosis, No clubbing. Good range of motion in all major joints. No tenderness to palpation or major deformities noted. Back- No tenderness. Integument:  Warm, Dry, No erythema, No rash. Lymphatic:  No lymphadenopathy noted. Neurologic:  Alert & oriented x 3, Normal motor function, Normal sensory function, No focal deficits noted.    Psychiatric:  Affect  and  Mood :no change    Medications:    insulin lispro  3 Units SubCUTAneous TID WC    dexamethasone  6 mg Oral Daily    Vitamin D  1,000 Units Oral Daily    zinc sulfate  50 mg Oral Daily    ascorbic acid  500 mg Oral Daily    insulin glargine  25 Units SubCUTAneous BID    insulin lispro  0-8 Units SubCUTAneous TID WC    insulin lispro  0-4 Units SubCUTAneous Nightly    aspirin  81 mg Oral Daily    atorvastatin  20 mg Oral Daily    cloNIDine  0.2 mg Oral BID    [Held by provider] lisinopril  40 mg Oral Daily    oxybutynin  5 mg Oral TID    [Held by provider] spironolactone  25 mg Oral Daily    [Held by provider] chlorthalidone  25 mg Oral Daily    sodium chloride flush  5-40 mL IntraVENous 2 times per day    enoxaparin  1 mg/kg SubCUTAneous BID    metoprolol tartrate  25 mg Oral BID      dextrose      sodium chloride       guaiFENesin-dextromethorphan, glucose, dextrose bolus **OR** dextrose bolus, glucagon (rDNA), dextrose, sodium chloride flush, sodium chloride, ondansetron **OR** ondansetron, polyethylene glycol, acetaminophen **OR** acetaminophen    Lab Data:  CBC:   Recent Labs     04/04/23  0153 04/05/23  0526 04/06/23  0816   WBC 4.6 9.3 11.5*   HGB 14.1 14.6 14.1   HCT 41.1* 42.9 41.5*   MCV 91.5 92.3 92.8    155 152     BMP:   Recent Labs     04/04/23  0153 04/05/23  0526 04/06/23  0816    135 136   K 4.0 4.1 4.1    101 99   CO2 22 21 24   BUN 38* 35* 42*
4/5/2023  EXAMINATION: ONE XRAY VIEW OF THE CHEST 4/5/2023 5:16 am COMPARISON: 04/03/2023 HISTORY: ORDERING SYSTEM PROVIDED HISTORY: Shortness of breath TECHNOLOGIST PROVIDED HISTORY: Reason for exam:->Shortness of breath Reason for Exam: Shortness of breath Follow-up exam FINDINGS: Slightly increasing bibasilar opacities. No focal consolidation, pleural effusion or pneumothorax. The cardiomediastinal silhouette is stable. No overt pulmonary edema. The osseous structures are stable. Slightly increasing bibasilar opacities likely atelectasis. XR CHEST PORTABLE    Result Date: 4/3/2023  EXAMINATION: ONE XRAY VIEW OF THE CHEST 4/3/2023 12:53 pm COMPARISON: 06/24/2021 HISTORY: ORDERING SYSTEM PROVIDED HISTORY: CP TECHNOLOGIST PROVIDED HISTORY: Reason for exam:->CP Initial evaluation FINDINGS: Monitor wires overlie the chest.  The trachea is midline. The cardiac silhouette is unremarkable. There appears to be scarring in the lung bases. No obvious superimposed acute process. Stable chest.  No acute cardiopulmonary process.        Electronically signed by Torri Zapata MD on 4/6/2023 at 11:43 AM

## 2023-04-07 NOTE — DISCHARGE INSTRUCTIONS
As we discussed you have a COVID-19 infection. Please finish the remaining 8 days of steroids at home. While you are on the steroids your blood sugars will be higher than usual and should go back down once you finish them. I have noticed that your heart rate drops during the night when you sleep. This may be a sign that you may have sleep apnea. Please talk to your doctor about getting tested for this. Given your blood pressures have been okay here without the spironolactone, benazapril and hydrochlorothiazide. I have decided to hold those for now. Do not take them at home. Please visit your primary care doctor within 2 weeks for repeat blood pressure check over there and possibly restarting them if okay. Please isolate from your wife and people around you for the next 5 days.

## 2023-04-09 LAB
CULTURE: NORMAL
Lab: NORMAL
SPECIMEN: NORMAL

## 2023-05-12 LAB
ALBUMIN SERPL-MCNC: 4 G/DL
ALP BLD-CCNC: 66 U/L
ALT SERPL-CCNC: 27 U/L
ANION GAP SERPL CALCULATED.3IONS-SCNC: NORMAL MMOL/L
AST SERPL-CCNC: 31 U/L
BASOPHILS ABSOLUTE: 0.1 /ΜL
BASOPHILS RELATIVE PERCENT: 1 %
BILIRUB SERPL-MCNC: 0.5 MG/DL (ref 0.1–1.4)
BUN BLDV-MCNC: 25 MG/DL
CALCIUM SERPL-MCNC: 9.6 MG/DL
CHLORIDE BLD-SCNC: 100 MMOL/L
CO2: 21 MMOL/L
CREAT SERPL-MCNC: 1.22 MG/DL
EGFR: NORMAL
EOSINOPHILS ABSOLUTE: 0.1 /ΜL
EOSINOPHILS RELATIVE PERCENT: 1 %
GLUCOSE BLD-MCNC: 110 MG/DL
HCT VFR BLD CALC: 37.3 % (ref 41–53)
HEMOGLOBIN: 12.8 G/DL (ref 13.5–17.5)
LYMPHOCYTES ABSOLUTE: 1.5 /ΜL
LYMPHOCYTES RELATIVE PERCENT: 18 %
MCH RBC QN AUTO: 31.2 PG
MCHC RBC AUTO-ENTMCNC: 34.3 G/DL
MCV RBC AUTO: 91 FL
MONOCYTES ABSOLUTE: 0.6 /ΜL
MONOCYTES RELATIVE PERCENT: 7 %
NEUTROPHILS ABSOLUTE: 6 /ΜL
NEUTROPHILS RELATIVE PERCENT: 72 %
PDW BLD-RTO: 13.9 %
PLATELET # BLD: 220 K/ΜL
PMV BLD AUTO: ABNORMAL FL
POTASSIUM SERPL-SCNC: 4.3 MMOL/L
RBC # BLD: 4.1 10^6/ΜL
SODIUM BLD-SCNC: 139 MMOL/L
TOTAL PROTEIN: 6.7
WBC # BLD: 8.4 10^3/ML

## 2023-05-22 ENCOUNTER — OFFICE VISIT (OUTPATIENT)
Dept: CARDIOLOGY CLINIC | Age: 73
End: 2023-05-22
Payer: MEDICARE

## 2023-05-22 VITALS
WEIGHT: 264 LBS | DIASTOLIC BLOOD PRESSURE: 80 MMHG | BODY MASS INDEX: 36.96 KG/M2 | SYSTOLIC BLOOD PRESSURE: 142 MMHG | HEIGHT: 71 IN | HEART RATE: 68 BPM

## 2023-05-22 DIAGNOSIS — I10 ESSENTIAL HYPERTENSION: ICD-10-CM

## 2023-05-22 DIAGNOSIS — I25.10 ASCVD (ARTERIOSCLEROTIC CARDIOVASCULAR DISEASE): Primary | ICD-10-CM

## 2023-05-22 PROCEDURE — 99214 OFFICE O/P EST MOD 30 MIN: CPT | Performed by: NURSE PRACTITIONER

## 2023-05-22 PROCEDURE — 1036F TOBACCO NON-USER: CPT | Performed by: NURSE PRACTITIONER

## 2023-05-22 PROCEDURE — 3079F DIAST BP 80-89 MM HG: CPT | Performed by: NURSE PRACTITIONER

## 2023-05-22 PROCEDURE — G8417 CALC BMI ABV UP PARAM F/U: HCPCS | Performed by: NURSE PRACTITIONER

## 2023-05-22 PROCEDURE — G8427 DOCREV CUR MEDS BY ELIG CLIN: HCPCS | Performed by: NURSE PRACTITIONER

## 2023-05-22 PROCEDURE — 3017F COLORECTAL CA SCREEN DOC REV: CPT | Performed by: NURSE PRACTITIONER

## 2023-05-22 PROCEDURE — 1123F ACP DISCUSS/DSCN MKR DOCD: CPT | Performed by: NURSE PRACTITIONER

## 2023-05-22 PROCEDURE — 3077F SYST BP >= 140 MM HG: CPT | Performed by: NURSE PRACTITIONER

## 2023-05-22 RX ORDER — SPIRONOLACTONE 25 MG/1
25 TABLET ORAL DAILY
Qty: 90 TABLET | Refills: 3
Start: 2023-05-22 | End: 2023-05-23 | Stop reason: SDUPTHER

## 2023-05-22 RX ORDER — PREDNISONE 10 MG/1
10 TABLET ORAL DAILY
COMMUNITY

## 2023-05-22 RX ORDER — DOXYCYCLINE HYCLATE 100 MG/1
100 CAPSULE ORAL 2 TIMES DAILY
COMMUNITY

## 2023-05-22 ASSESSMENT — ENCOUNTER SYMPTOMS
ORTHOPNEA: 0
BACK PAIN: 1
SHORTNESS OF BREATH: 1

## 2023-05-22 NOTE — PROGRESS NOTES
meals)      cloNIDine (CATAPRES) 0.2 MG tablet 1 tablet 2 times daily      aspirin 81 MG chewable tablet Take 1 tablet by mouth daily (Patient taking differently: Take 325 mg by mouth daily) 30 tablet 3    insulin glargine (LANTUS) 100 UNIT/ML injection vial Inject 33 Units into the skin 2 times daily      alogliptin (NESINA) 25 MG TABS tablet Take 12.5 mg by mouth daily       oxybutynin (DITROPAN) 5 MG tablet Take 1 tablet by mouth 3 times daily 270 tablet 1    atorvastatin (LIPITOR) 20 MG tablet Take 1 tablet by mouth daily 90 tablet 1    Blood Glucose Monitoring Suppl (ACURA BLOOD GLUCOSE METER) W/DEVICE KIT 1 kit by Does not apply route once for 1 dose 1 kit 0     No current facility-administered medications for this visit. All pertinent data reviewed and discussed with patient   Transesophageal echocardiogram 04/2023   No evidence of thrombus within the left atrial appendage. Left ventricular systolic function is low normal.   Ejection fraction is visually estimated at 50%. aortic valve leaflets do not show any evidence of vegetation or abnormality   Structurally normal mitral valve. Mild mitral regurgitation is present. Trans thoracic echocardiogram  04/04/2023    Left ventricular systolic function is low normal.   Ejection fraction is visually estimated at 50%. Grade I diastolic dysfunction. Mobile echodensity noted on the aortic valve, recommend AKOSUA for further   evaluation (images 9 and 47). Large fat pad present. ASSESSMENT/PLAN:      1. ASCVD (arteriosclerotic cardiovascular disease)  Has known history of cardiovascular disease with PCI. Was recently seen in in [facility] with elevated troponins and cp( between shoulder blades)- May have been a type 2 rise in the setting of CKD,  COVID- and pneumonia -  pain resolved  Continue with medical management : ASA atorvastatin metoprolol     2.  Essential hypertension  Bp is up today - resume aldactone   Continue clonidine  Check BMP in 1

## 2023-05-23 DIAGNOSIS — I10 ESSENTIAL HYPERTENSION: ICD-10-CM

## 2023-05-23 RX ORDER — SPIRONOLACTONE 25 MG/1
25 TABLET ORAL DAILY
Qty: 90 TABLET | Refills: 3 | Status: SHIPPED | OUTPATIENT
Start: 2023-05-23

## 2023-06-28 LAB
AMBIGUOUS ABBREVIATION: NORMAL
BUN / CREAT RATIO: 17 (ref 10–24)
BUN BLDV-MCNC: 22 MG/DL (ref 8–27)
CALCIUM SERPL-MCNC: 10.5 MG/DL (ref 8.6–10.2)
CHLORIDE BLD-SCNC: 100 MMOL/L (ref 96–106)
CO2: 20 MMOL/L (ref 20–29)
CREAT SERPL-MCNC: 1.32 MG/DL (ref 0.76–1.27)
ESTIMATED GLOMERULAR FILTRATION RATE CREATININE EQUATION: 57 ML/MIN/1.73
GLUCOSE BLD-MCNC: 216 MG/DL (ref 70–99)
POTASSIUM SERPL-SCNC: 4.8 MMOL/L (ref 3.5–5.2)
SODIUM BLD-SCNC: 139 MMOL/L (ref 134–144)

## 2023-07-31 ENCOUNTER — OFFICE VISIT (OUTPATIENT)
Dept: CARDIOLOGY CLINIC | Age: 73
End: 2023-07-31
Payer: MEDICARE

## 2023-07-31 VITALS
OXYGEN SATURATION: 98 % | HEART RATE: 66 BPM | BODY MASS INDEX: 37.66 KG/M2 | WEIGHT: 269 LBS | SYSTOLIC BLOOD PRESSURE: 130 MMHG | HEIGHT: 71 IN | DIASTOLIC BLOOD PRESSURE: 72 MMHG

## 2023-07-31 DIAGNOSIS — I25.10 ASCVD (ARTERIOSCLEROTIC CARDIOVASCULAR DISEASE): Primary | ICD-10-CM

## 2023-07-31 DIAGNOSIS — E78.2 MIXED HYPERLIPIDEMIA: ICD-10-CM

## 2023-07-31 DIAGNOSIS — I10 ESSENTIAL HYPERTENSION: ICD-10-CM

## 2023-07-31 PROBLEM — I21.4 NSTEMI (NON-ST ELEVATED MYOCARDIAL INFARCTION) (HCC): Status: RESOLVED | Noted: 2020-09-28 | Resolved: 2023-07-31

## 2023-07-31 PROCEDURE — 1036F TOBACCO NON-USER: CPT | Performed by: NURSE PRACTITIONER

## 2023-07-31 PROCEDURE — 3078F DIAST BP <80 MM HG: CPT | Performed by: NURSE PRACTITIONER

## 2023-07-31 PROCEDURE — G8417 CALC BMI ABV UP PARAM F/U: HCPCS | Performed by: NURSE PRACTITIONER

## 2023-07-31 PROCEDURE — 3017F COLORECTAL CA SCREEN DOC REV: CPT | Performed by: NURSE PRACTITIONER

## 2023-07-31 PROCEDURE — 99214 OFFICE O/P EST MOD 30 MIN: CPT | Performed by: NURSE PRACTITIONER

## 2023-07-31 PROCEDURE — G8427 DOCREV CUR MEDS BY ELIG CLIN: HCPCS | Performed by: NURSE PRACTITIONER

## 2023-07-31 PROCEDURE — 1123F ACP DISCUSS/DSCN MKR DOCD: CPT | Performed by: NURSE PRACTITIONER

## 2023-07-31 PROCEDURE — 3075F SYST BP GE 130 - 139MM HG: CPT | Performed by: NURSE PRACTITIONER

## 2023-07-31 ASSESSMENT — ENCOUNTER SYMPTOMS
SHORTNESS OF BREATH: 0
ORTHOPNEA: 0

## 2023-07-31 NOTE — PROGRESS NOTES
prevention. Recommend he continue with aspirin and metoprolol    2. Essential hypertension  Blood pressure stable with Catapres and Aldactone: No changes    3. Mixed hyperlipidemia  Lipids are at goal.  His LDL is 54 which is at target. Recommend continue with atorvastatin       Diabetes mellitus  A1c 7.2     Tests ordered: None  Follow-up 9 months or sooner if needed    Signed:  MARILYN Jasso CNP, 7/31/2023, 1:59 PM    An electronic signature was used to authenticate this note. Please note this report has been partially produced using speech recognition software and may contain errors related to that system including errors in grammar, punctuation, and spelling, as well as words and phrases that may be inappropriate. If there are any questions or concerns please feel free to contact the dictating provider for clarification.    \

## 2023-07-31 NOTE — PATIENT INSTRUCTIONS
**It is YOUR responsibilty to bring medication bottles and/or updated medication list to [facility]. This will allow us to better serve you and all your healthcare needs**  [address] [facility] Laboratory Locations - No appointment necessary. Sites open Monday to Friday. Call your preferred location for test preparation, business   hours and other information you need. [employer] [facility] [employer]. [address] [place] [address]. [address] [place], [facility]  Phone: [phone]     Please be informed that if you contact our office outside of normal business hours the physician on call cannot help with any scheduling or rescheduling issues, procedure instruction questions or any type of medication issue. We advise you for any urgent/emergency that you go to the nearest emergency room! PLEASE CALL OUR OFFICE DURING NORMAL BUSINESS HOURS    Monday - Friday   8 am to 5 pm    [place]: [address] [place]: [phone]    [place]:  [phone]  Thank you for allowing us to care for you today! We want to ensure we can follow your treatment plan and we strive to give you the best outcomes and experience possible. If you ever have a life threatening emergency and call 911 - for an ambulance (EMS)   Our providers can only care for you at:   [facility] or formerly [facility]. Even if you have someone take you or you drive yourself we can only care for you in a [facility] facility. Our providers are not setup at the other healthcare locations! We are committed to providing you the best care possible. If you receive a survey after visiting one of our offices, please take time to share your experience concerning your physician office visit. These surveys are confidential and no health information about you is shared. We are eager to improve for you and we are counting on your feedback to help make that happen.

## 2024-04-30 ENCOUNTER — OFFICE VISIT (OUTPATIENT)
Dept: CARDIOLOGY CLINIC | Age: 74
End: 2024-04-30
Payer: MEDICARE

## 2024-04-30 VITALS
HEIGHT: 71 IN | WEIGHT: 284 LBS | DIASTOLIC BLOOD PRESSURE: 74 MMHG | OXYGEN SATURATION: 98 % | SYSTOLIC BLOOD PRESSURE: 130 MMHG | HEART RATE: 68 BPM | BODY MASS INDEX: 39.76 KG/M2

## 2024-04-30 DIAGNOSIS — I10 ESSENTIAL HYPERTENSION: ICD-10-CM

## 2024-04-30 DIAGNOSIS — E78.2 MIXED HYPERLIPIDEMIA: ICD-10-CM

## 2024-04-30 DIAGNOSIS — I25.10 ASCVD (ARTERIOSCLEROTIC CARDIOVASCULAR DISEASE): Primary | ICD-10-CM

## 2024-04-30 PROCEDURE — G8427 DOCREV CUR MEDS BY ELIG CLIN: HCPCS | Performed by: NURSE PRACTITIONER

## 2024-04-30 PROCEDURE — 3017F COLORECTAL CA SCREEN DOC REV: CPT | Performed by: NURSE PRACTITIONER

## 2024-04-30 PROCEDURE — 93000 ELECTROCARDIOGRAM COMPLETE: CPT | Performed by: NURSE PRACTITIONER

## 2024-04-30 PROCEDURE — G8417 CALC BMI ABV UP PARAM F/U: HCPCS | Performed by: NURSE PRACTITIONER

## 2024-04-30 PROCEDURE — 1036F TOBACCO NON-USER: CPT | Performed by: NURSE PRACTITIONER

## 2024-04-30 PROCEDURE — 99214 OFFICE O/P EST MOD 30 MIN: CPT | Performed by: NURSE PRACTITIONER

## 2024-04-30 PROCEDURE — 1123F ACP DISCUSS/DSCN MKR DOCD: CPT | Performed by: NURSE PRACTITIONER

## 2024-04-30 PROCEDURE — 3075F SYST BP GE 130 - 139MM HG: CPT | Performed by: NURSE PRACTITIONER

## 2024-04-30 PROCEDURE — 3078F DIAST BP <80 MM HG: CPT | Performed by: NURSE PRACTITIONER

## 2024-04-30 ASSESSMENT — ENCOUNTER SYMPTOMS
SHORTNESS OF BREATH: 0
ORTHOPNEA: 0

## 2024-04-30 NOTE — PROGRESS NOTES
tenderness.      Right lower leg: No edema.      Left lower leg: No edema.   Skin:     General: Skin is warm and dry.      Capillary Refill: Capillary refill takes less than 2 seconds.   Neurological:      Mental Status: He is alert and oriented to person, place, and time.                Current Outpatient Medications   Medication Sig Dispense Refill    metoprolol tartrate (LOPRESSOR) 25 MG tablet Take 0.5 tablets by mouth 2 times daily 90 tablet 3    spironolactone (ALDACTONE) 25 MG tablet Take 1 tablet by mouth daily 90 tablet 3    glipiZIDE (GLUCOTROL) 5 MG tablet Take 2 tablets by mouth 2 times daily      cloNIDine (CATAPRES) 0.2 MG tablet 1 tablet 2 times daily      aspirin 81 MG chewable tablet Take 1 tablet by mouth daily (Patient taking differently: Take 325 mg by mouth daily) 30 tablet 3    insulin glargine (LANTUS) 100 UNIT/ML injection vial Inject 33 Units into the skin 2 times daily      alogliptin (NESINA) 25 MG TABS tablet Take 12.5 mg by mouth daily       oxybutynin (DITROPAN) 5 MG tablet Take 1 tablet by mouth 3 times daily 270 tablet 1    atorvastatin (LIPITOR) 20 MG tablet Take 1 tablet by mouth daily 90 tablet 1    doxycycline hyclate (VIBRAMYCIN) 100 MG capsule Take 1 capsule by mouth 2 times daily (Patient not taking: Reported on 4/30/2024)      predniSONE (DELTASONE) 10 MG tablet Take 1 tablet by mouth daily (Patient not taking: Reported on 4/30/2024)      metFORMIN (GLUCOPHAGE) 500 MG tablet Take 1 tablet by mouth 2 times daily (with meals) (Patient not taking: Reported on 4/30/2024)      Blood Glucose Monitoring Suppl (ACURA BLOOD GLUCOSE METER) W/DEVICE KIT 1 kit by Does not apply route once for 1 dose 1 kit 0     No current facility-administered medications for this visit.          All pertinent data reviewed and discussed with patient       ASSESSMENT/PLAN:    1. Coronary artery disease s/p PCI  2. Essential hypertension  3. Mixed hyperlipidemia  4. Diabetes mellitus    Doing well  He

## 2024-10-30 ENCOUNTER — OFFICE VISIT (OUTPATIENT)
Dept: CARDIOLOGY CLINIC | Age: 74
End: 2024-10-30

## 2024-10-30 VITALS
WEIGHT: 289 LBS | BODY MASS INDEX: 40.46 KG/M2 | DIASTOLIC BLOOD PRESSURE: 70 MMHG | HEART RATE: 61 BPM | SYSTOLIC BLOOD PRESSURE: 130 MMHG | HEIGHT: 71 IN | OXYGEN SATURATION: 96 %

## 2024-10-30 DIAGNOSIS — I25.10 ASCVD (ARTERIOSCLEROTIC CARDIOVASCULAR DISEASE): Primary | ICD-10-CM

## 2024-10-30 DIAGNOSIS — I10 ESSENTIAL HYPERTENSION: ICD-10-CM

## 2024-10-30 RX ORDER — INSULIN ASPART 100 [IU]/ML
INJECTION, SOLUTION INTRAVENOUS; SUBCUTANEOUS
COMMUNITY

## 2024-10-30 RX ORDER — FINASTERIDE 5 MG/1
5 TABLET, FILM COATED ORAL
COMMUNITY
Start: 2024-08-26

## 2024-10-30 ASSESSMENT — ENCOUNTER SYMPTOMS
SHORTNESS OF BREATH: 0
ORTHOPNEA: 0

## 2024-10-30 NOTE — PROGRESS NOTES
10/30/2024  Primary cardiologist: Dr. Monzon    CC:   Nelson  is an established 74 y.o.  male here for a 3 month follow up on CAD      SUBJECTIVE/OBJECTIVE:  HPI  Nelson is a 74 y.o. male with a history of coronary artery disease,  PCI to proximal and distal RCA and proximal distal LAD, hypertension, hyperlipidemia, obesity, diabetes mellitus and CKD    Nelson has noticed progressive shortness of breath.  Walking from the parking lot to the office he was short of breath. He has  his activity d/t neuropathy and shortness of breath. He states he is unstable his feet. He is walking with a cane.       Review of Systems   Constitutional: Negative for diaphoresis and malaise/fatigue.   HENT:  Positive for hearing loss ([place]).    Cardiovascular:  Positive for dyspnea on exertion. Negative for chest pain, claudication, irregular heartbeat, leg swelling, near-syncope, orthopnea, palpitations and paroxysmal nocturnal dyspnea.   Respiratory:  Negative for shortness of breath.    Neurological:  Positive for paresthesias (feet). Negative for dizziness and light-headedness.       Vitals:    10/30/24 1105   BP: 130/70   Site: Left Upper Arm   Position: Sitting   Cuff Size: Large Adult   Pulse: 61   SpO2: 96%   Weight: 131.1 kg (289 lb)   Height: 1.803 m (5' 11\")         Wt Readings from Last 3 Encounters:   10/30/24 131.1 kg (289 lb)   24 128.8 kg (284 lb)   23 122 kg (269 lb)     Body mass index is 40.31 kg/m².    Physical Exam  Constitutional:       Appearance: He is obese.   HENT:      Mouth/Throat:      Mouth: Mucous membranes are moist.   Neck:      Vascular: No carotid bruit.   Cardiovascular:      Rate and Rhythm: Normal rate and regular rhythm.      Pulses: Normal pulses.   Pulmonary:      Effort: Pulmonary effort is normal.   Abdominal:      General: There is no distension.      Palpations: Abdomen is soft.      Tenderness: There is no abdominal tenderness.   Musculoskeletal:         General:

## 2024-10-31 ENCOUNTER — TELEPHONE (OUTPATIENT)
Dept: CARDIOLOGY CLINIC | Age: 74
End: 2024-10-31

## 2025-04-21 NOTE — PROGRESS NOTES
Cardiac Clearance  ([place] [facility])   Needs Further Workup Not Cleared Cleared ([place] risk below)     Low Risk     Moderate Risk     High Risk   Additional Notes:   Consult/ Referral   CT Surgery Electrophysiology TAVR Coordination (Jeanne Peabody) Heart Failure [facility] Venous Ablation Consult Watchman Consult  ([place] Phys[facility] Below)        Karan Keith   Additional Notes:   Labs Date Last Completed Date Expected Retrieve from outside source?    BMP       BNP       CBC       INR       Lipid       TSH       A1C       Other:      Additional Notes:   Medication/Samples Type of Medication Additional Notes    Samples of      Paper script      Research Study     Other:   Next Office Visit  ([place] [facility])   1 month 3 months 6 months 9 months 1 year PRN 1 week 2 weeks   Additional Notes:

## 2025-04-30 ENCOUNTER — OFFICE VISIT (OUTPATIENT)
Dept: CARDIOLOGY CLINIC | Age: 75
End: 2025-04-30
Payer: OTHER GOVERNMENT

## 2025-04-30 VITALS
OXYGEN SATURATION: 94 % | DIASTOLIC BLOOD PRESSURE: 72 MMHG | WEIGHT: 282 LBS | HEIGHT: 71 IN | BODY MASS INDEX: 39.48 KG/M2 | SYSTOLIC BLOOD PRESSURE: 136 MMHG | HEART RATE: 65 BPM

## 2025-04-30 DIAGNOSIS — I20.9 ANGINA PECTORIS: ICD-10-CM

## 2025-04-30 DIAGNOSIS — R06.02 SHORTNESS OF BREATH: Primary | ICD-10-CM

## 2025-04-30 PROCEDURE — 1159F MED LIST DOCD IN RCRD: CPT | Performed by: INTERNAL MEDICINE

## 2025-04-30 PROCEDURE — 3075F SYST BP GE 130 - 139MM HG: CPT | Performed by: INTERNAL MEDICINE

## 2025-04-30 PROCEDURE — 99214 OFFICE O/P EST MOD 30 MIN: CPT | Performed by: INTERNAL MEDICINE

## 2025-04-30 PROCEDURE — 1123F ACP DISCUSS/DSCN MKR DOCD: CPT | Performed by: INTERNAL MEDICINE

## 2025-04-30 PROCEDURE — 3078F DIAST BP <80 MM HG: CPT | Performed by: INTERNAL MEDICINE

## 2025-04-30 RX ORDER — OXYMETAZOLINE HYDROCHLORIDE 0.05 G/100ML
2 SPRAY NASAL 2 TIMES DAILY
COMMUNITY

## 2025-04-30 RX ORDER — ATORVASTATIN CALCIUM 80 MG/1
80 TABLET, FILM COATED ORAL DAILY
COMMUNITY

## 2025-04-30 RX ORDER — ASPIRIN 325 MG
325 TABLET ORAL DAILY
COMMUNITY

## 2025-04-30 NOTE — PROGRESS NOTES
48    Cancer Brother     Heart Attack Sister     COPD Sister     Cancer Brother         colon    Other Paternal Grandmother         hemorrhaged to death    Diabetes Maternal Aunt     Diabetes Maternal Aunt     Heart Disease Maternal Aunt     Diabetes Other      Social History     Tobacco Use    Smoking status: Former     Current packs/day: 0.00     Average packs/day: 2.5 packs/day for 20.0 years (50.0 ttl pk-yrs)     Types: Cigarettes     Start date: 5/10/1969     Quit date: 5/10/1989     Years since quittin.9    Smokeless tobacco: Never   Substance Use Topics    Alcohol use: Yes     Comment: rare      Review of Systems:    Constitutional: Negative for diaphoresis and fatigue  Psychological:Negative for anxiety or depression  HENT: Negative for headaches, nasal congestion, sinus pain or vertigo  Eyes: Negative for visual disturbance.   Endocrine: Negative for polydipsia/polyuria  Respiratory: Negative for shortness of breath  Cardiovascular: Negative for chest pain, dyspnea on exertion, claudication, edema, irregular heartbeat, murmur, palpitations or shortness of breath  Gastrointestinal: Negative for abdominal pain or heartburn  Genito-Urinary: Negative for urinary frequency/urgency  Musculoskeletal: Negative for muscle pain, muscular weakness, negative for pain in arm and leg or swelling in foot and leg  Neurological: Negative for dizziness, headaches, memory loss, numbness/tingling, visual changes, syncope  Dermatological: Negative for rash    Objective:    Vitals:    25 1424   BP: 136/72   BP Site: Right Upper Arm   Patient Position: Sitting   BP Cuff Size: Medium Adult   Pulse: 65   SpO2: 94%   Weight: 127.9 kg (282 lb)   Height: 1.803 m (5' 11\")       /72 (BP Site: Right Upper Arm, Patient Position: Sitting, BP Cuff Size: Medium Adult)   Pulse 65   Ht 1.803 m (5' 11\")   Wt 127.9 kg (282 lb)   SpO2 94%   BMI 39.33 kg/m²          No data to display                 Wt Readings from Last 3

## 2025-05-27 ENCOUNTER — OFFICE VISIT (OUTPATIENT)
Dept: CARDIOLOGY CLINIC | Age: 75
End: 2025-05-27
Payer: OTHER GOVERNMENT

## 2025-05-27 VITALS
HEART RATE: 72 BPM | BODY MASS INDEX: 40.52 KG/M2 | HEIGHT: 70 IN | WEIGHT: 283 LBS | DIASTOLIC BLOOD PRESSURE: 74 MMHG | SYSTOLIC BLOOD PRESSURE: 122 MMHG

## 2025-05-27 DIAGNOSIS — I25.10 ASCVD (ARTERIOSCLEROTIC CARDIOVASCULAR DISEASE): Primary | ICD-10-CM

## 2025-05-27 DIAGNOSIS — I10 ESSENTIAL HYPERTENSION: ICD-10-CM

## 2025-05-27 PROBLEM — I20.0 UNSTABLE ANGINA (HCC): Status: RESOLVED | Noted: 2023-04-04 | Resolved: 2025-05-27

## 2025-05-27 PROCEDURE — 1159F MED LIST DOCD IN RCRD: CPT | Performed by: NURSE PRACTITIONER

## 2025-05-27 PROCEDURE — 93000 ELECTROCARDIOGRAM COMPLETE: CPT | Performed by: NURSE PRACTITIONER

## 2025-05-27 PROCEDURE — 1160F RVW MEDS BY RX/DR IN RCRD: CPT | Performed by: NURSE PRACTITIONER

## 2025-05-27 PROCEDURE — 1123F ACP DISCUSS/DSCN MKR DOCD: CPT | Performed by: NURSE PRACTITIONER

## 2025-05-27 PROCEDURE — 99214 OFFICE O/P EST MOD 30 MIN: CPT | Performed by: NURSE PRACTITIONER

## 2025-05-27 PROCEDURE — G8427 DOCREV CUR MEDS BY ELIG CLIN: HCPCS | Performed by: NURSE PRACTITIONER

## 2025-05-27 PROCEDURE — 1036F TOBACCO NON-USER: CPT | Performed by: NURSE PRACTITIONER

## 2025-05-27 PROCEDURE — G8417 CALC BMI ABV UP PARAM F/U: HCPCS | Performed by: NURSE PRACTITIONER

## 2025-05-27 PROCEDURE — 3017F COLORECTAL CA SCREEN DOC REV: CPT | Performed by: NURSE PRACTITIONER

## 2025-05-27 PROCEDURE — 3078F DIAST BP <80 MM HG: CPT | Performed by: NURSE PRACTITIONER

## 2025-05-27 PROCEDURE — 3074F SYST BP LT 130 MM HG: CPT | Performed by: NURSE PRACTITIONER

## 2025-05-27 ASSESSMENT — ENCOUNTER SYMPTOMS
SHORTNESS OF BREATH: 0
ORTHOPNEA: 0

## 2025-05-27 NOTE — PROGRESS NOTES
CLINICAL STAFF DOCUMENTATION         Nelson Rice  1950  [id number]    Have you had any Chest Pain recently? - No          Have you had any Shortness of Breath - Yes  If Yes - When at rest and on exertion  How many flights of stairs can you go up without having SOB? - none.   Are you on Oxygen during the day or at night? - No  How many pillows do you sleep with under your head? - 1    Have you had any dizziness - No      Have you had any palpitations recently? - No    Do you have any edema - swelling in both legs,ankles and feet        When did you have your last labs drawn PATIENT [facility]OWED  RESULTS LAST OV       Do you have a surgery or procedure scheduled in the near future - No        Caffeine? - Yes  How much caffeine? . Couple cups of coffee a day an couple sodas

## 2025-05-27 NOTE — PROGRESS NOTES
5/27/2025  Primary cardiologist: Dr. Monzon    CC:   Nelson  is an established 74 y.o.  male here for a follow up on testing : kailyn and echo      SUBJECTIVE/OBJECTIVE:  Nelson is a 74 y.o. male with a history of coronary artery disease,  PCI to proximal and distal RCA and proximal distal LAD, hypertension, hyperlipidemia, obesity, diabetes mellitus and CKD     HPI:  Nelson is being seen today to follow-up on recent Lexiscan and echocardiogram  He has ongoing shortness of breath. He walks 30 yards and he feels short of breath. He is limited with activity d/t leg pain.       Review of Systems   Constitutional: Negative for diaphoresis and malaise/fatigue.   Cardiovascular:  Positive for dyspnea on exertion. Negative for chest pain, claudication, irregular heartbeat, leg swelling, near-syncope, orthopnea, palpitations and paroxysmal nocturnal dyspnea.   Respiratory:  Negative for shortness of breath.    Neurological:  Negative for dizziness and light-headedness.        Neuropathy legs/ feet and hands        Vitals:    05/27/25 1045   BP: 122/74   BP Site: Left Upper Arm   Patient Position: Sitting   BP Cuff Size: Large Adult   Pulse: 72   Weight: 128.4 kg (283 lb)   Height: 1.778 m (5' 10\")     Wt Readings from Last 3 Encounters:   05/27/25 128.4 kg (283 lb)   05/22/25 127.9 kg (282 lb)   04/30/25 127.9 kg (282 lb)      Body mass index is 40.61 kg/m².     Physical Exam  Vitals reviewed.   Constitutional:       Appearance: He is obese.   Eyes:      Pupils: Pupils are equal, round, and reactive to light.   Neck:      Vascular: No carotid bruit.   Cardiovascular:      Rate and Rhythm: Normal rate and regular rhythm.      Pulses: Normal pulses.   Pulmonary:      Effort: Pulmonary effort is normal.      Breath sounds: Normal breath sounds.   Musculoskeletal:      Right lower leg: No edema.      Left lower leg: No edema.   Skin:     General: Skin is warm and dry.      Capillary Refill: Capillary refill takes less than 2